# Patient Record
Sex: MALE | Race: WHITE | NOT HISPANIC OR LATINO | Employment: FULL TIME | URBAN - METROPOLITAN AREA
[De-identification: names, ages, dates, MRNs, and addresses within clinical notes are randomized per-mention and may not be internally consistent; named-entity substitution may affect disease eponyms.]

---

## 2017-03-23 ENCOUNTER — OFFICE VISIT - RIVER FALLS (OUTPATIENT)
Dept: FAMILY MEDICINE | Facility: CLINIC | Age: 42
End: 2017-03-23

## 2018-10-27 ENCOUNTER — OFFICE VISIT - RIVER FALLS (OUTPATIENT)
Dept: FAMILY MEDICINE | Facility: CLINIC | Age: 43
End: 2018-10-27

## 2018-11-06 ENCOUNTER — OFFICE VISIT - RIVER FALLS (OUTPATIENT)
Dept: FAMILY MEDICINE | Facility: CLINIC | Age: 43
End: 2018-11-06

## 2021-05-25 ENCOUNTER — OFFICE VISIT - RIVER FALLS (OUTPATIENT)
Dept: FAMILY MEDICINE | Facility: CLINIC | Age: 46
End: 2021-05-25

## 2021-05-25 ASSESSMENT — MIFFLIN-ST. JEOR: SCORE: 2182.63

## 2021-05-26 ENCOUNTER — OFFICE VISIT - RIVER FALLS (OUTPATIENT)
Dept: FAMILY MEDICINE | Facility: CLINIC | Age: 46
End: 2021-05-26

## 2021-05-26 ENCOUNTER — COMMUNICATION - RIVER FALLS (OUTPATIENT)
Dept: FAMILY MEDICINE | Facility: CLINIC | Age: 46
End: 2021-05-26

## 2021-05-26 LAB
CHOLEST SERPL-MCNC: 146 MG/DL
CHOLEST/HDLC SERPL: 3.8 {RATIO}
GLUCOSE BLD-MCNC: 315 MG/DL (ref 65–99)
HDLC SERPL-MCNC: 38 MG/DL
LDLC SERPL CALC-MCNC: 87 MG/DL
NONHDLC SERPL-MCNC: 108 MG/DL
TRIGL SERPL-MCNC: 112 MG/DL

## 2021-05-27 LAB — HBA1C MFR BLD: 10.4 %

## 2021-05-28 ENCOUNTER — OFFICE VISIT - RIVER FALLS (OUTPATIENT)
Dept: FAMILY MEDICINE | Facility: CLINIC | Age: 46
End: 2021-05-28

## 2021-05-28 ASSESSMENT — MIFFLIN-ST. JEOR: SCORE: 2179

## 2021-05-29 LAB
A/G RATIO - HISTORICAL: 1.2 (ref 1–2.5)
ALBUMIN SERPL-MCNC: 4.3 GM/DL (ref 3.6–5.1)
ALP SERPL-CCNC: 98 UNIT/L (ref 36–130)
ALT SERPL W P-5'-P-CCNC: 21 UNIT/L (ref 9–46)
AST SERPL W P-5'-P-CCNC: 15 UNIT/L (ref 10–40)
BASOPHILS # BLD MANUAL: 31 10*3/UL (ref 0–200)
BASOPHILS NFR BLD MANUAL: 0.6 %
BILIRUB SERPL-MCNC: 1.1 MG/DL (ref 0.2–1.2)
BUN SERPL-MCNC: 18 MG/DL (ref 7–25)
BUN/CREAT RATIO - HISTORICAL: ABNORMAL (ref 6–22)
CALCIUM SERPL-MCNC: 9.7 MG/DL (ref 8.6–10.3)
CHLORIDE BLD-SCNC: 100 MMOL/L (ref 98–110)
CO2 SERPL-SCNC: 29 MMOL/L (ref 20–32)
CREAT SERPL-MCNC: 0.8 MG/DL (ref 0.6–1.35)
CREAT UR-MCNC: 138 MG/DL (ref 20–320)
EGFRCR SERPLBLD CKD-EPI 2021: 108 ML/MIN/1.73M2
EOSINOPHIL # BLD MANUAL: 71 10*3/UL (ref 15–500)
EOSINOPHIL NFR BLD MANUAL: 1.4 %
ERYTHROCYTE [DISTWIDTH] IN BLOOD BY AUTOMATED COUNT: 13.1 % (ref 11–15)
GLOBULIN: 3.5 (ref 1.9–3.7)
GLUCOSE BLD-MCNC: 294 MG/DL (ref 65–99)
HCT VFR BLD AUTO: 44.5 % (ref 38.5–50)
HGB BLD-MCNC: 15 GM/DL (ref 13.2–17.1)
LYMPHOCYTES # BLD MANUAL: 1841 10*3/UL (ref 850–3900)
LYMPHOCYTES NFR BLD MANUAL: 36.1 %
MCH RBC QN AUTO: 29.5 PG (ref 27–33)
MCHC RBC AUTO-ENTMCNC: 33.7 GM/DL (ref 32–36)
MCV RBC AUTO: 87.6 FL (ref 80–100)
MICROALBUMIN UR-MCNC: 4.4 MG/DL
MICROALBUMIN/CREAT UR: 32 MG/G{CREAT}
MONOCYTES # BLD MANUAL: 439 10*3/UL (ref 200–950)
MONOCYTES NFR BLD MANUAL: 8.6 %
NEUTROPHILS # BLD MANUAL: 2718 10*3/UL (ref 1500–7800)
NEUTROPHILS NFR BLD MANUAL: 53.3 %
PLATELET # BLD AUTO: 140 10*3/UL (ref 140–400)
PMV BLD: 13.1 FL (ref 7.5–12.5)
POTASSIUM BLD-SCNC: 4.8 MMOL/L (ref 3.5–5.3)
PROT SERPL-MCNC: 7.8 GM/DL (ref 6.1–8.1)
RBC # BLD AUTO: 5.08 10*6/UL (ref 4.2–5.8)
SODIUM SERPL-SCNC: 135 MMOL/L (ref 135–146)
TSH SERPL DL<=0.005 MIU/L-ACNC: 2.03 MIU/L (ref 0.4–4.5)
WBC # BLD AUTO: 5.1 10*3/UL (ref 3.8–10.8)

## 2021-06-01 ENCOUNTER — COMMUNICATION - RIVER FALLS (OUTPATIENT)
Dept: FAMILY MEDICINE | Facility: CLINIC | Age: 46
End: 2021-06-01

## 2021-06-01 ENCOUNTER — OFFICE VISIT - RIVER FALLS (OUTPATIENT)
Dept: FAMILY MEDICINE | Facility: CLINIC | Age: 46
End: 2021-06-01

## 2021-06-01 ASSESSMENT — MIFFLIN-ST. JEOR: SCORE: 2169.93

## 2021-06-24 ENCOUNTER — OFFICE VISIT - RIVER FALLS (OUTPATIENT)
Dept: FAMILY MEDICINE | Facility: CLINIC | Age: 46
End: 2021-06-24

## 2021-06-24 ASSESSMENT — MIFFLIN-ST. JEOR: SCORE: 2173.56

## 2021-07-29 ENCOUNTER — OFFICE VISIT - RIVER FALLS (OUTPATIENT)
Dept: FAMILY MEDICINE | Facility: CLINIC | Age: 46
End: 2021-07-29

## 2021-07-29 ASSESSMENT — MIFFLIN-ST. JEOR: SCORE: 2108.24

## 2021-09-14 ENCOUNTER — AMBULATORY - RIVER FALLS (OUTPATIENT)
Dept: FAMILY MEDICINE | Facility: CLINIC | Age: 46
End: 2021-09-14

## 2021-09-15 ENCOUNTER — COMMUNICATION - RIVER FALLS (OUTPATIENT)
Dept: FAMILY MEDICINE | Facility: CLINIC | Age: 46
End: 2021-09-15

## 2021-09-15 LAB
BUN SERPL-MCNC: 21 MG/DL (ref 7–25)
BUN/CREAT RATIO - HISTORICAL: ABNORMAL (ref 6–22)
CALCIUM SERPL-MCNC: 9.7 MG/DL (ref 8.6–10.3)
CHLORIDE BLD-SCNC: 103 MMOL/L (ref 98–110)
CO2 SERPL-SCNC: 31 MMOL/L (ref 20–32)
CREAT SERPL-MCNC: 0.75 MG/DL (ref 0.6–1.35)
EGFRCR SERPLBLD CKD-EPI 2021: 111 ML/MIN/1.73M2
GLUCOSE BLD-MCNC: 124 MG/DL (ref 65–99)
HBA1C MFR BLD: 6.5 %
POTASSIUM BLD-SCNC: 4.9 MMOL/L (ref 3.5–5.3)
SODIUM SERPL-SCNC: 138 MMOL/L (ref 135–146)

## 2021-12-07 ENCOUNTER — OFFICE VISIT - RIVER FALLS (OUTPATIENT)
Dept: FAMILY MEDICINE | Facility: CLINIC | Age: 46
End: 2021-12-07

## 2021-12-08 ENCOUNTER — COMMUNICATION - RIVER FALLS (OUTPATIENT)
Dept: FAMILY MEDICINE | Facility: CLINIC | Age: 46
End: 2021-12-08

## 2021-12-08 LAB — HBA1C MFR BLD: 6.1 %

## 2022-02-11 VITALS
TEMPERATURE: 98.7 F | OXYGEN SATURATION: 95 % | WEIGHT: 306.8 LBS | WEIGHT: 308.8 LBS | TEMPERATURE: 97.7 F | DIASTOLIC BLOOD PRESSURE: 90 MMHG | SYSTOLIC BLOOD PRESSURE: 140 MMHG | DIASTOLIC BLOOD PRESSURE: 93 MMHG | SYSTOLIC BLOOD PRESSURE: 135 MMHG | HEART RATE: 97 BPM | HEART RATE: 102 BPM

## 2022-02-11 VITALS
BODY MASS INDEX: 37.49 KG/M2 | SYSTOLIC BLOOD PRESSURE: 114 MMHG | WEIGHT: 262.4 LBS | DIASTOLIC BLOOD PRESSURE: 80 MMHG | BODY MASS INDEX: 37.65 KG/M2 | HEART RATE: 106 BPM | DIASTOLIC BLOOD PRESSURE: 84 MMHG | WEIGHT: 278.8 LBS | HEART RATE: 60 BPM | WEIGHT: 278 LBS | TEMPERATURE: 96.9 F | WEIGHT: 276.8 LBS | DIASTOLIC BLOOD PRESSURE: 77 MMHG | SYSTOLIC BLOOD PRESSURE: 120 MMHG | WEIGHT: 276 LBS | HEART RATE: 80 BPM | SYSTOLIC BLOOD PRESSURE: 132 MMHG | BODY MASS INDEX: 37.76 KG/M2 | SYSTOLIC BLOOD PRESSURE: 143 MMHG | DIASTOLIC BLOOD PRESSURE: 84 MMHG | HEIGHT: 72 IN | HEIGHT: 72 IN | HEIGHT: 72 IN | OXYGEN SATURATION: 100 % | HEIGHT: 72 IN | TEMPERATURE: 97.7 F | BODY MASS INDEX: 37.38 KG/M2 | HEIGHT: 72 IN | BODY MASS INDEX: 35.54 KG/M2

## 2022-02-11 VITALS
HEART RATE: 72 BPM | WEIGHT: 270 LBS | DIASTOLIC BLOOD PRESSURE: 81 MMHG | SYSTOLIC BLOOD PRESSURE: 133 MMHG | BODY MASS INDEX: 36.62 KG/M2

## 2022-02-11 VITALS
WEIGHT: 315 LBS | TEMPERATURE: 98.8 F | HEART RATE: 80 BPM | DIASTOLIC BLOOD PRESSURE: 80 MMHG | SYSTOLIC BLOOD PRESSURE: 128 MMHG

## 2022-02-16 NOTE — LETTER
(Inserted Image. Unable to display)            June 01, 2021        AILIN [place] [address]        Dear AILIN,    Thank you for selecting [facility]  for your healthcare needs.  Below you will find the results of the recent tests done at our clinic.     We will discuss this at your next visit.      Result Name Current Result Previous Result Reference Range   Sodium Level (mmol/L)  135 5/28/2021  135 - 146   Potassium Level (mmol/L)  4.8 5/28/2021  3.5 - 5.3   Glucose Level (mg/dL) ((H)) 294 5/28/2021 ((H)) 315 5/25/2021 65 - 99   Creatinine Level (mg/dL)  0.80 5/28/2021  0.60 - 1.35   AST/SGOT (unit/L)  15 5/28/2021  10 - 40   ALT/SGPT (unit/L)  21 5/28/2021  9 - 46   TSH (mIU/L)  2.03 5/28/2021  0.40 - 4.50   Ur Microalbumin/Creatinine Ratio ((H)) 32 5/28/2021   - <30   WBC  5.1 5/28/2021  3.8 - 10.8   Hgb (gm/dL)  15.0 5/28/2021  13.2 - 17.1       Please contact me or my assistant at [phone] if you have any questions.     Sincerely,        Markos Dodge MD        What do your labs mean?  Below is a glossary of commonly ordered labs:  LDL   Bad Cholesterol   HDL   Good Cholesterol  AST/ALT   Liver Function   Cr/Creatinine   Kidney Function  Microalbumin   Kidney Function  BUN   Kidney Function  PSA   Prostate    TSH   Thyroid Hormone  HgbA1c   Diabetes Test   Hgb (Hemoglobin)   Red Blood Cells

## 2022-02-16 NOTE — LETTER
(Inserted Image. Unable to display)   August 25, 2021    AILIN [place] [address]            Dear AILIN,      Thank you for selecting [facility] for your healthcare needs.    Our records indicate you are due for the following services:     Non-Fasting Labs.    If you had your labs done at another facility or with [facility] at [facility], please bring in a copy of the results to your next visit, mail a copy, or drop off a copy of your results to your Healthcare Provider.    (FYI   Regarding office visits: In some instances, a video visit or telephone visit may be offered as an option.)      To schedule an appointment or if you have further questions, please contact your clinic at [phone].      Powered by [employer]    Sincerely,    Markos Dodge MD

## 2022-02-16 NOTE — PROGRESS NOTES
Patient:   AILIN PAUL            MRN: [id number]            FIN: [id number]               Age:   45 years     Sex:  Male     :  1975   Associated Diagnoses:   Type 2 diabetes, HbA1c goal < 7%; HTN (hypertension)   Author:   Markos Dodge MD      Visit Information      Date of Service: 2021 08:53 am  Performing Location: [facility]  Encounter#: [id number]      Primary Care Provider (PCP):  Richard Karimi MD    NPI# [id number]      Referring Provider:  Markos Dodge MD    NPI# [id number]      Chief Complaint   2021 9:02 AM CDT    follow up labs, elevated A1c        History of Present Illness   Patient here with new diagnosis of diabetes.  He presented a few days ago for symptoms of erectile dysfunction.  Labs then revealed a blood sugar of over 300 with an A1c of 10.4.  He is totally asymptomatic.  He is overweight and family history of adult diabetes in his father.         Review of Systems   Constitutional:  Negative.    Eye:  Negative.    Ear/Nose/Mouth/Throat:  Negative.    Respiratory:  Negative.    Cardiovascular:  Negative.    Gastrointestinal:  Negative.    Genitourinary:  Negative except as documented in history of present illness.    Hematology/Lymphatics:  Negative.    Endocrine:  Negative.    Immunologic:  Negative.    Musculoskeletal:  Negative.    Integumentary:  Negative.    Neurologic:  Negative.       Health Status   Allergies:    Allergic Reactions (Selected)  Severity Not Documented  No known allergies (No reactions were documented)   Medications:  (Selected)   Prescriptions  Prescribed  losartan 25 mg oral tablet: = 1 tab(s) ( 25 mg ), Oral, daily, # 90 tab(s), 1 Refill(s), Type: Maintenance, Pharmacy: [facility], 1 tab(s) Oral daily, 72, in, 21 9:02:00 CDT, Height Measured, 278, lb, 21 9:02:00 CDT, Weight Measured  metFORMIN 500 mg oral tablet, extended release: = 1 tab(s) ( 500 mg ), Oral, daily, # 30 tab(s), 0  Refill(s), Type: Maintenance, Pharmacy: [facility], 1 tab(s) Oral daily, 72, in, 05/28/21 9:02:00 CDT, Height Measured, 278, lb, 05/28/21 9:02:00 CDT, Weight Measured  sildenafil 20 mg oral tablet: See Instructions, Instructions: 1 tab(s) Oral up to 5 tabs daily as needed, # 50 EA, 11 Refill(s), Type: Maintenance, Pharmacy: [facility], 1 tab(s) Oral up to 5 tabs daily as needed, 72, in, 05/25/21 16:02:00 CDT, Height Pancho...,    Medications          *denotes recorded medication          losartan 25 mg oral tablet: 25 mg, 1 tab(s), Oral, daily, 90 tab(s), 1 Refill(s).          metFORMIN 500 mg oral tablet, extended release: 500 mg, 1 tab(s), Oral, daily, 30 tab(s), 0 Refill(s).          sildenafil 20 mg oral tablet: See Instructions, 1 tab(s) Oral up to 5 tabs daily as needed, 50 EA, 11 Refill(s).       Problem list:    All Problems  ED (erectile dysfunction) / SNOMED CT 1960396637 / Confirmed  Obesity / SNOMED CT 9545913527 / Probable  Type 2 diabetes, HbA1c goal < 7% / SNOMED CT 533891274 / Confirmed      Histories   Past Medical History:    No active or resolved past medical history items have been selected or recorded.   Family History:    No family history items have been selected or recorded.   Procedure history:    No active procedure history items have been selected or recorded.   Social History:        Electronic Cigarette/Vaping Assessment            Electronic Cigarette Use: Former use, quit more than 90 days ago.      Tobacco Assessment            Former smoker, quit more than 30 days ago        Physical Examination   Vital Signs   5/28/2021 9:02 AM CDT Peripheral Pulse Rate 80 bpm    Pulse Site Brachial artery    HR Method Electronic    Systolic Blood Pressure 132 mmHg  HI    Diastolic Blood Pressure 84 mmHg  HI    Mean Arterial Pressure 100 mmHg    BP Site Right arm    BP Method Electronic      Measurements from flowsheet : Measurements   5/28/2021 9:02  AM CDT Height Measured - Standard 72 in    Weight Measured - Standard 278 lb    BSA 2.53 m2    Body Mass Index 37.7 kg/m2  HI      General:  Alert and oriented, No acute distress.    Eye:  Pupils are equal, round and reactive to light, Extraocular movements are intact.    Neck:  Supple, Non-tender, No lymphadenopathy, No thyromegaly.    Respiratory:  Lungs are clear to auscultation, Respirations are non-labored.    Cardiovascular:  Normal rate, Regular rhythm, No murmur, Good pulses equal in all extremities, Normal peripheral perfusion, No edema.    Gastrointestinal:  Soft, Non-tender, No organomegaly.    Musculoskeletal:  No tenderness, No swelling.    Integumentary:  No rash.    Feet:  Normal by visual exam, Normal pulses, Sensation intact, By monofilament exam.    Neurologic:  Alert, Oriented, No focal deficits, Cranial Nerves II-XII are grossly intact.       Health Maintenance      Recommendations     Pending (in the next year)        Due            Alcohol Misuse Screen due  05/28/21  and every 1  year(s)           DM - Communication with Managing Provider due  05/28/21  and every 1  year(s)           DM - Eye Exam due  05/28/21  and every 1  year(s)           DM - Foot Exam due  05/28/21  and every 1  year(s)           DM - Microalbumin due  05/28/21  and every 1  year(s)           Depression Screen due  05/28/21  and every 1  year(s)           HIV Screen (if sexually active) due  05/28/21  and every 1  year(s)           STD Counseling (if sexually active) (Male) due  05/28/21  and every 1  year(s)           Syphilis Screen (if sexually active) (Male) due  05/28/21  and every 1  year(s)        Due In Future            DM - HgbA1c not due until  08/26/21  and every 3  month(s)           Influenza Vaccine not due until  09/01/21  and every 1  year(s)           Tobacco Use Screen (Male) not due until  05/25/22  and every 1  year(s)           Lipid Disorders Screen (Male) not due until  05/25/22  and every 1   year(s)           Type 2 Diabetes Mellitus Screen (Male) not due until  05/26/22  and every 1  year(s)     Satisfied (in the past 1 year)        Satisfied            Body Mass Index Check on  05/28/21.           Body Mass Index Check on  05/25/21.           DM - HgbA1c on  05/26/21.           High Blood Pressure Screen on  05/28/21.           High Blood Pressure Screen on  05/25/21.           Lipid Disorders Screen (Male) on  05/25/21.           Lipid Disorders Screen (Male) on  05/25/21.           Lipid Disorders Screen (Male) on  05/25/21.           Lipid Disorders Screen (Male) on  05/25/21.           Obesity Screen and Counseling (Male) on  05/28/21.           Obesity Screen and Counseling (Male) on  05/25/21.           Tobacco Use Screen (Male) on  05/25/21.           Type 2 Diabetes Mellitus Screen (Male) on  05/26/21.          Review / Management   Results review:  Lab results   5/26/2021 4:40 PM CDT Hgb A1c 10.4  HI   5/25/2021 4:32 PM CDT Glucose Level 315 mg/dL  HI    Cholesterol 146 mg/dL    Non-HDL Cholesterol 108    HDL 38 mg/dL  LOW    Cholesterol/HDL Ratio 3.8    LDL 87    Triglyceride 112 mg/dL   .       Impression and Plan   Diagnosis     Type 2 diabetes, HbA1c goal < 7% (CPF41-GK E11.9).     HTN (hypertension) (XPT07-QP I10).     Plan:  Patient with new adult onset diabetes mellitus.  He has an appointment next week with diabetic education will be taught glucose monitoring and diabetic diet as well as cares.  He will see his eye doctor for diabetic eye exam.  We will start him on Metformin increased dose in a month when he returns add losartan for both renal protection and blood pressures that are slightly elevated reviewed his labs with him today.  We discussed diabetes and cares.  , BMI,Weight loss,exercise,diet discussed.

## 2022-02-16 NOTE — PROGRESS NOTES
Patient:   AILIN PAUL            MRN: [id number]            FIN: [id number]               Age:   42 years     Sex:  Male     :  1975   Associated Diagnoses:   Cellulitis   Author:   Markos Dodge MD      Visit Information      Date of Service: 2018 06:17 pm  Performing Location: [facility]  Encounter#: [id number]      Primary Care Provider (PCP):  Richard Karimi MD    NPI# [id number]      Referring Provider:  Markos Dodge MD    NPI# [id number]      Chief Complaint   2018 6:23 PM CST    f/up cellulitis        History of Present Illness   chief complaint and symptoms as noted above confirmed with patient   was better on tmp-s not clear Getting worse No fever Minimal discon=mfort      Review of Systems   Constitutional:  Negative except as documented in history of present illness.    Respiratory:  Negative.    Cardiovascular:  Negative.    Gastrointestinal:  Negative.    Genitourinary:  Negative.    Musculoskeletal:  Negative except as documented in history of present illness.    Integumentary:  Negative except as documented in history of present illness.    Neurologic:  Negative.       Health Status   Allergies:    Allergic Reactions (Selected)  Severity Not Documented  No known allergies (No reactions were documented)   Medications:  (Selected)   Prescriptions  Prescribed  Bactrim  mg-160 mg oral tablet: 1 tab(s), PO, BID, # 20 tab(s), 0 Refill(s), Type: Maintenance, Pharmacy: [facility], 1 tab(s) Oral bid,x10 day(s)  Keflex 500 mg oral capsule: = 2 cap(s) ( 1,000 mg ), PO, QID, # 80 cap(s), 0 Refill(s), Type: Maintenance, Pharmacy: [facility], 2 cap(s) Oral qid,x10 day(s)  Documented Medications  Documented  Advil: ( 800 mg ), po, q4-6 hrs, 0 Refill(s), Type: Maintenance,    Medications          *denotes recorded medication          Keflex 500 mg oral capsule: 1,000 mg, 2 cap(s), PO, QID, for 10 day(s), 80 cap(s),  0 Refill(s).          *Advil: 800 mg, po, q4-6 hrs, 0 Refill(s).          Bactrim  mg-160 mg oral tablet: 1 tab(s), PO, BID, for 10 day(s), 20 tab(s), 0 Refill(s).        Histories   Past Medical History:    No active or resolved past medical history items have been selected or recorded.   Family History:    No family history items have been selected or recorded.   Procedure history:    No active procedure history items have been selected or recorded.   Social History:             No active social history items have been recorded.      Physical Examination   Vital Signs   11/6/2018 6:23 PM CST Temperature Tympanic 97.7 DegF  LOW    Peripheral Pulse Rate 97 bpm    HR Method Electronic    Systolic Blood Pressure 135 mmHg  HI    Diastolic Blood Pressure 93 mmHg  HI    Mean Arterial Pressure 107 mmHg    BP Method Electronic      Measurements from flowsheet : Measurements   11/6/2018 6:23 PM CST    Weight Measured - Standard                306.8 lb     General:  Alert and oriented, No acute distress.    Integumentary:  2 lpl edema left lower leg with reddness warmth lower 1/2  cms otherwise intact.    Neurologic:  Alert, Oriented.       Impression and Plan   Diagnosis     Cellulitis (DII96-BF L03.90).     Course:  Not progressing as expected.    Plan:  keflex fu friday soone rif worse.    Patient Instructions:       Counseled: Patient, Regarding diagnosis, Regarding treatment, Regarding medications, Activity.

## 2022-02-16 NOTE — PROGRESS NOTES
Chief Complaint    swollen lower left leg, tender, getting over cold  History of Present Illness      Chief complaint as above reviewed and confirmed with patient.  Pt presents to the clinic with concerns re: L leg redness, swelling and discomfort.  He denies known trauma.  He has had 2 spots on his leg last week that he thought were insect bites.  In the last several days increased redness, swelling.  no fevers. no tingling or numbness.  Elevation not helpful.  no hx of similar.  no hx of DVT in the past.  Review of Systems      Review of systems is negative with the exception of those noted in HPI          Physical Exam   Vitals & Measurements    T: 98.7   F (Tympanic)  HR: 102(Peripheral)  BP: 140/90  SpO2: 95%     WT: 308.8 lb       exam of the LLE reveals moderate erythema and swelling present.  Erythema is in stocking glove distribution but more severe laterally.  erythema extends from ankle to the jsut about 4-5 cm distal to the knee.  There is swelling of the foot dorsally but no erythema of the foot.  there is a small elen red circular area medial calf about mid 1/3 that is circular and with dry dermatitis present.  sensation and peripheral pulses intact, cap refill brisk.  no pain with AROM at the ankle. no joint effusion noted.       LLE duplex US negative for DVT.          Assessment/Plan       1. Cellulitis (L03.90)         bactrim as ordered. PI given and discussed. push fluids, rest and elevation.  warm compresses.  If not improving by mid week should be rechecked.  Pt agreeable.                2. Left leg swelling (M79.89)         Orders:          19341 office outpatient visit 15 minutes (Charge), Quantity: 1, Left leg swelling          US Lower Extremity Venous Doppler (Request), Priority: STAT, Instructions: Left, Left leg swelling                3. Immunization due (Z23)         Orders:          tetanus/diphth/pertuss (Tdap) adult/adol, 0.5 mL, im, once, (Completed)          82690 imadm prq id  subq/im njxs 1 vaccine (Charge), Quantity: 1, Immunization due          94113 tdap vaccine 7/> yr im (Charge), Quantity: 1, Immunization due                Orders:         sulfamethoxazole-trimethoprim, 1 tab(s), PO, BID, # 20 tab(s), 0 Refill(s), Type: Maintenance, Pharmacy: [facility], 1 tab(s) Oral bid,x10 day(s), (Ordered)  Patient Information     Name:AILIN PAUL      Address:      [address] [place] [address]     Sex:Male     YOB: 1975     Phone:[phone]     Emergency Contact:HEATHER PAUL     MRN:[id number]     FIN:[id number]     Location:[facility]     Date of Service:10/27/2018      Primary Care Physician:       Richard Karimi MD, [phone]      Attending Physician:       Muna Clay PA-C, [phone]  Problem List/Past Medical History    Ongoing     No qualifying data    Historical     No qualifying data  Medications     Advil: 800 mg, po, q4-6 hrs, 0 Refill(s).     Bactrim  mg-160 mg oral tablet: 1 tab(s), PO, BID, for 10 day(s), 20 tab(s), 0 Refill(s).          Allergies    No known allergies  Social History    Smoking Status - 10/27/2018     Current some day smoker  Immunizations      Vaccine Date Status      tetanus/diphth/pertuss (Tdap) adult/adol 10/27/2018 Given      Td 10/05/1996 Recorded

## 2022-02-16 NOTE — NURSING NOTE
Pt called looking for test results for A1C that was completed yesterday.  Pt would like to come in and  the results.  Printed and left at  for pt to .

## 2022-02-16 NOTE — PROGRESS NOTES
"   Patient:   AILIN PAUL            MRN: [id number]            FIN: [id number]               Age:   45 years     Sex:  Male     :  1975   Associated Diagnoses:   Type 2 diabetes, HbA1c goal < 7%; Obesity; HTN (hypertension)   Author:   Maxine Beard      Visit Information   Visit type:  Diabetes Self Management Education .    Referral source:  Markos Dodge MD.       Chief Complaint   DM Type II, Obesity       History of Present Illness   2021  Pt here with new surprising dx of DM Type II.  Pt was seen by MD with c/o ED.  Pt does not have an annual physical and MD completed lab work with initial glucose of 315 and follow up a1c 10.4%.  Pt without typical hyperglycemic symptoms.     2021 Follow up education, weight is down 13.6# since 2021; Pt's highest weight was 383#; congratulated pt on his significant weight loss.     Discussed nutrition, diabetes, and lifestyle management with pt.  Over the past year+ pt has been working on weight loss, healthier eating and activity.    Nutrition: Pt is reading labels and following carbohydrate controlled meal plan, continues to make adjustments and knows that he has some \"off\" days.    mid morning yogurt, afternoon a Lean Cuisine or leftovers (portion controlled), afternoon 1-2 rice cakes; evening protein and vegetables and a starch small amount if any   Fluids: zero soda or water, rare milk   Physical Activity: taking daily walks, began jogging a little also on the treadmill; getting in >150 min/ week   Stress:   low/ mod  Sleep: good  Support: wife  BG Testin day avg out of 12 readings 128 mg/dL  14 day avg out of 24 readings 133 mg/dL  30 day avg out of 54 readings 131 mg/dL   90 day avg out of 103 reading 135 mg/dL   DM related medication: metformin ER 500mg ii tab daily - taking as directed (MD increased metformin 2021  Routine diabetes care:    Dilated Retinal Eye Exam:  due   Skin: intact   Dental: good brushing habits   Feet: no " concerns  Immunizations: COVID - 19 vaccines completed, influenza and pneumonia vaccines due  Hgb A1c: 10.4% = 252 mg/dL estimated average glucose 5/26/2021      Health Status   Allergies:    Allergic Reactions (Selected)  Severity Not Documented  No known allergies (No reactions were documented)   Medications:  (Selected)   Prescriptions  Prescribed  Accu-check Meter: Accu-check Meter, See Instructions, Instructions: use as directed-testing 2x daily, Supply, # 1 EA, 0 Refill(s), Type: Maintenance, Pharmacy: [facility], use as directed-testing 2x daily, 72, in, 06/01/21 12:30:00 CDT, Height...  Accu-check lancets: Accu-check lancets, See Instructions, Instructions: testing 2x/ day, Supply, # 100 EA, 3 Refill(s), Type: Maintenance, Pharmacy: [facility], testing 2x/ day, 72, in, 06/01/21 12:30:00 CDT, Height Measured, 276, lb, 06/01/21 12...  Accu-check test strips: Accu-check test strips, See Instructions, Instructions: testing 2x/ day, Supply, # 200 EA, 3 Refill(s), Type: Maintenance, Pharmacy: [facility], testing 2x/ day, 72, in, 06/01/21 12:30:00 CDT, Height Measured, 276, lb, 06/01/2...  losartan 25 mg oral tablet: = 1 tab(s) ( 25 mg ), Oral, daily, # 90 tab(s), 1 Refill(s), Type: Maintenance, Pharmacy: [facility], 1 tab(s) Oral daily, 72, in, 05/28/21 9:02:00 CDT, Height Measured, 278, lb, 05/28/21 9:02:00 CDT, Weight Measured  metFORMIN 500 mg oral tablet, extended release: = 2 tab(s) ( 1,000 mg ), Oral, daily, # 180 tab(s), 1 Refill(s), Type: Maintenance, Pharmacy: [facility], 2 tab(s) Oral daily, 72, in, 06/24/21 8:22:00 CDT, Height Measured, 276.8, lb, 06/24/21 8:22:00 CDT, Weight Measured  sildenafil 20 mg oral tablet: See Instructions, Instructions: 1 tab(s) Oral up to 5 tabs daily as needed, # 50 EA, 11 Refill(s), Type: Maintenance, Pharmacy: [facility] [place], 1  tab(s) Oral up to 5 tabs daily as needed, 72, in, 05/25/21 16:02:00 CDT, Height Pancho...,    Medications          *denotes recorded medication          Accu-check test strips: See Instructions, testing 2x/ day, 200 EA, 3 Refill(s).          Accu-check lancets: See Instructions, testing 2x/ day, 100 EA, 3 Refill(s).          Accu-check Meter: See Instructions, use as directed-testing 2x daily, 1 EA, 0 Refill(s).          losartan 25 mg oral tablet: 25 mg, 1 tab(s), Oral, daily, 90 tab(s), 1 Refill(s).          metFORMIN 500 mg oral tablet, extended release: 1,000 mg, 2 tab(s), Oral, daily, 180 tab(s), 1 Refill(s).          sildenafil 20 mg oral tablet: See Instructions, 1 tab(s) Oral up to 5 tabs daily as needed, 50 EA, 11 Refill(s).       Problem list:    All Problems  Type 2 diabetes, HbA1c goal < 7% / SNOMED CT 577854269 / Confirmed  Obesity / SNOMED CT 5345069529 / Probable  HTN (hypertension) / SNOMED CT 0540083387 / Confirmed  ED (erectile dysfunction) / SNOMED CT 9494899523 / Confirmed      Histories   Past Medical History:    No active or resolved past medical history items have been selected or recorded.   Family History:    No family history items have been selected or recorded.   Procedure history:    No active procedure history items have been selected or recorded.   Social History:        Electronic Cigarette/Vaping Assessment            Electronic Cigarette Use: Former use, quit more than 90 days ago.      Tobacco Assessment            Former smoker, quit more than 30 days ago        Physical Examination   Vital Signs   7/29/2021 11:04 AM CDT Systolic Blood Pressure 114 mmHg    Diastolic Blood Pressure 77 mmHg    Mean Arterial Pressure 89 mmHg      Measurements from flowsheet : Measurements   7/29/2021 11:04 AM CDT Height Measured - Standard 72 in    Weight Measured - Standard 262.4 lb    BSA 2.46 m2    Body Mass Index 35.58 kg/m2  HI         Health Maintenance      Recommendations     Pending (in the  next year)        Due            Alcohol Misuse Screen due  07/29/21  and every 1  year(s)           DM - Communication with Managing Provider due  07/29/21  and every 1  year(s)           DM - Eye Exam due  07/29/21  and every 1  year(s)           Depression Screen due  07/29/21  and every 1  year(s)           HIV Screen (if sexually active) due  07/29/21  and every 1  year(s)           Syphilis Screen (if sexually active) due  07/29/21  and every 1  year(s)        Near Due            DM - HgbA1c near due  08/26/21  and every 3  month(s)           Influenza Vaccine near due  09/01/21  and every 1  year(s)        Due In Future            Lipid Disorders Screen not due until  05/25/22  and every 1  year(s)           Type 2 Diabetes Mellitus Screen not due until  05/26/22  and every 1  year(s)           DM - Microalbumin not due until  05/28/22  and every 1  year(s)           DM - Foot Exam not due until  05/28/22  and every 1  year(s)           Tobacco Use Screen not due until  06/24/22  and every 1  year(s)     Satisfied (in the past 1 year)        Satisfied            Body Mass Index Check on  07/29/21.           Body Mass Index Check on  06/24/21.           Body Mass Index Check on  06/01/21.           Body Mass Index Check on  05/28/21.           Body Mass Index Check on  05/25/21.           COVID-19 Vaccine (Moderna) Dose 2 on  05/14/21.           COVID-19 Vaccine (Moderna) Dose 1 on  04/16/21.           DM - Foot Exam on  05/28/21.           DM - HgbA1c on  05/26/21.           DM - Microalbumin on  05/28/21.           DM - Microalbumin on  05/28/21.           High Blood Pressure Screen on  07/29/21.           High Blood Pressure Screen on  06/24/21.           High Blood Pressure Screen on  05/28/21.           High Blood Pressure Screen on  05/25/21.           Lipid Disorders Screen on  05/25/21.           Lipid Disorders Screen on  05/25/21.           Lipid Disorders Screen on  05/25/21.           Lipid  Disorders Screen on  05/25/21.           Obesity Screen and Counseling on  07/29/21.           Obesity Screen and Counseling on  06/24/21.           Obesity Screen and Counseling on  06/01/21.           Obesity Screen and Counseling on  05/28/21.           Obesity Screen and Counseling on  05/25/21.           Tobacco Use Screen on  06/24/21.           Tobacco Use Screen on  05/25/21.           Type 2 Diabetes Mellitus Screen on  05/26/21.        Canceled            STD Counseling (If sexually active) on  06/24/21.          Review / Management   Results review:  Lab results   5/28/2021 9:31 AM CDT Sodium Level 135 mmol/L    Potassium Level 4.8 mmol/L    Chloride Level 100 mmol/L    CO2 Level 29 mmol/L    Glucose Level 294 mg/dL  HI    BUN 18 mg/dL    Creatinine 0.80 mg/dL    BUN/Creat Ratio NOT APPLICABLE    eGFR 108 mL/min/1.73m2    eGFR African American 125 mL/min/1.73m2    Calcium Level 9.7 mg/dL    Bili Total 1.1 mg/dL    Alk Phos 98 unit/L    AST/SGOT 15 unit/L    ALT/SGPT 21 unit/L    Protein Total 7.8 gm/dL    Albumin Level 4.3 gm/dL    Globulin 3.5    A/G Ratio 1.2    TSH 2.03 mIU/L    U Creatinine 138 mg/dL    U Microalbumin 4.4 mg/dL    Ur Microalb/Creat Ratio 32  HI    WBC 5.1    RBC 5.08    Hgb 15.0 gm/dL    Hct 44.5 %    MCV 87.6 fL    MCH 29.5 pg    MCHC 33.7 gm/dL    RDW 13.1 %    Platelet 140    MPV 13.1 fL  HI    Lymphs 36.1 %    Abs Lymphs 1,841    Neutrophils 53.3 %    Abs Neutrophils 2,718    Monocytes 8.6 %    Abs Monocytes 439    Eosinophils 1.4 %    Abs Eosinophils 71    Basophils 0.6 %    Abs Basophils 31   5/26/2021 4:40 PM CDT Hgb A1c 10.4  HI   5/25/2021 4:32 PM CDT Glucose Level 315 mg/dL  HI    Cholesterol 146 mg/dL    Non-    HDL 38 mg/dL  LOW    Chol/HDL Ratio 3.8    LDL 87    Triglyceride 112 mg/dL   .       Impression and Plan   Diagnosis     Type 2 diabetes, HbA1c goal < 7% (EAT36-VG E11.9).     Obesity (TMD18-CB E66.9).     HTN (hypertension) (FBA22-EU I10).       Counseled:  Patient, AADE7 Self Care Behaviors   Today the patient was provided with a review and further instruction on the progression of diabetes mellitus, prevention of heart disease, prevention of complications, and lifestyle guidelines.  Healthy Eating - review of carbohydrate counting, reading food labels, appropriate portion sizes, well balanced meals, diabetic plate method as a general rule.  Patient is provided with additional ideas to incorporate dietary recommendations, increase meal planning and preparation.  Mindful eating, finding other coping mechanisms besides food  Instructed on Therapeutic Lifestyle Changes (TLC) nutrition plan for heart healthy eating.     Low cholesterol (<200mg), low fat, low saturated fat, zero trans fat   Low sodium (2000mg)   High fiber diet (20 - 30gm); Soluble fiber 10+ gm/ day    Eat more omega 3 fats  Vegetarian at least 1 day per week  Being Active - Education on how exercise helps with :    - lowering BG by increasing the muscles ability to take up and use glucose   - weight loss   - healthier heart (improve lipid profile)   - improve sleep, mood, energy   - decrease stress  Monitoring -  Reviewed recommended testing schedule and blood glucose target levels.    Taking Medication - on Metformin - education on the mechanism of action. Discussed gradual progression of diabetes and potential to increase/ add medication in the future.      Problem Solving - medical support team assistance, resources provided (written and apps, internet); good control of stress  Healthy Coping - support of friends, family, and medical support team   Reducing Risks - Detailed education on potential complications associated with uncontrolled diabetes and prevention recommendations.  Recommendations include: annual eye exam, good oral cares with brushing BID and flossing daily, routine dental appointments, good foot care tips and shoe wear - discussed monofilament test yearly.  Importance of adequate sleep  and good control of BG to prevent developing complications related to uncontrolled DM including nephropathy, neuropathy, retinopathy, and cardiovascular disease.  Weight loss goal of 7 - 10% of current body weight pounds as a reasonable goal to help increase insulin sensitivity     Goals:  1.  A1c <6.5%  2.  BG testing 2x/ day on 2 days/ week before eating 80 - 130 target; two hours after eating 80 - 150mg/dL  3.  Physical active 150 min/ week   4.  Carb controlled heart healthy meal plan <130gm CHO/ day  5.  Take medications as directed continue metformin ER 500mg i tab daily    Follow up in 4-6 mo       Professional Services   Time spent with pt 30 min   cc  Dr. Dodge

## 2022-02-16 NOTE — LETTER
(Inserted Image. Unable to display)            September 15, 2021        AIL[place] [address]        Dear AILIN,    Thank you for selecting [facility]  for your healthcare needs.  Below you will find the results of the recent tests done at our clinic.     All labs acceptable.      Result Name Current Result Previous Result Reference Range   Potassium Level (mmol/L)  4.9 9/14/2021  4.8 5/28/2021 3.5 - 5.3   Glucose Level (mg/dL) ((H)) 124 9/14/2021 ((H)) 294 5/28/2021 65 - 99   Creatinine Level (mg/dL)  0.75 9/14/2021  0.80 5/28/2021 0.60 - 1.35   Hgb A1c ((H)) 6.5 9/14/2021 ((H)) 10.4 5/26/2021  - <5.7       Please contact me or my assistant at [phone] if you have any questions.     Sincerely,        Markos Dodge MD        What do your labs mean?  Below is a glossary of commonly ordered labs:  LDL   Bad Cholesterol   HDL   Good Cholesterol  AST/ALT   Liver Function   Cr/Creatinine   Kidney Function  Microalbumin   Kidney Function  BUN   Kidney Function  PSA   Prostate    TSH   Thyroid Hormone  HgbA1c   Diabetes Test   Hgb (Hemoglobin)   Red Blood Cells

## 2022-02-16 NOTE — PROGRESS NOTES
Patient:   AILIN PAUL            MRN: [id number]            FIN: [id number]               Age:   45 years     Sex:  Male     :  1975   Associated Diagnoses:   ED (erectile dysfunction)   Author:   Markos Dodge MD      Visit Information      Date of Service: 2021 03:57 pm  Performing Location: [facility]  Encounter#: [id number]      Primary Care Provider (PCP):  Richard Karimi MD    NPI# [id number]      Referring Provider:  Markos Dodge MD    NPI# [id number]      Chief Complaint   2021 4:02 PM CDT    ED concerns.        History of Present Illness   Patient here for erectile dysfunction.  He notes it has been an issue for few years but worse over the last 6 months.  He has difficulty getting and maintaining erections.  Denies any health problems.  Denies any significant alcohol use.  Is a non-smoker.         Review of Systems   Constitutional:  Negative except as documented in history of present illness.    Respiratory:  Negative.    Cardiovascular:  Negative.    Gastrointestinal:  Negative.    Genitourinary:  Negative except as documented in history of present illness.    Neurologic:  Negative.       Health Status   Allergies:    Allergic Reactions (Selected)  Severity Not Documented  No known allergies (No reactions were documented)   Medications:  (Selected)   Prescriptions  Prescribed  sildenafil 20 mg oral tablet: See Instructions, Instructions: 1 tab(s) Oral up to 5 tabs daily as needed, # 50 EA, 11 Refill(s), Type: Maintenance, Pharmacy: [facility], 1 tab(s) Oral up to 5 tabs daily as needed, 72, in, 21 16:02:00 CDT, Height Pancho...   Problem list:    All Problems  Obesity / SNOMED CT 4011860156 / Probable      Histories   Past Medical History:    No active or resolved past medical history items have been selected or recorded.   Family History:    No family history items have been selected or recorded.   Procedure history:    No  active procedure history items have been selected or recorded.   Social History:        Electronic Cigarette/Vaping Assessment            Electronic Cigarette Use: Former use, quit more than 90 days ago.      Tobacco Assessment            Former smoker, quit more than 30 days ago        Physical Examination   Measurements from flowsheet : Measurements   5/25/2021 4:02 PM CDT Height Measured - Standard 72 in    Weight Measured - Standard 278.8 lb    BSA 2.53 m2    Body Mass Index 37.81 kg/m2  HI      General:  Alert and oriented, No acute distress.    Neurologic:  Alert, Oriented.       Impression and Plan   Diagnosis     ED (erectile dysfunction) (XIL35-ED N52.9).     Plan:  Patient with erectile dysfunction discussed sildenafil and use.  If he does not see improvement with sildenafil will get further work-up.  Also reviewed healthcare maintenance including colon cancer screening lipids and glucose.  Also reviewed his elevated blood pressure and handout given  .

## 2022-02-16 NOTE — NURSING NOTE
Comprehensive Intake Entered On:  6/24/2021 8:26 AM CDT    Performed On:  6/24/2021 8:22 AM CDT by Jadon ALEJANDRO, Fany               Summary   Chief Complaint :   1  month f/u   Weight Measured :   276.8 lb(Converted to: 276 lb 13 oz, 125.554 kg)    Height Measured :   72 in(Converted to: 6 ft 0 in, 182.88 cm)    Body Mass Index :   37.54 kg/m2 (HI)    Body Surface Area :   2.52 m2   Systolic Blood Pressure :   120 mmHg   Diastolic Blood Pressure :   80 mmHg   Mean Arterial Pressure :   93 mmHg   Peripheral Pulse Rate :   60 bpm   BP Site :   Right arm   Pulse Site :   Radial artery   BP Method :   Electronic   HR Method :   Electronic   Temperature Tympanic :   96.9 DegF(Converted to: 36.1 DegC)  (LOW)    Oxygen Saturation :   100 %   Fany Diop MA - 6/24/2021 8:22 AM CDT   Health Status   Allergies Verified? :   Yes   Medication History Verified? :   Yes   Medical History Verified? :   Yes   Pre-Visit Planning Status :   Completed   Tobacco Use? :   Former smoker   Fany Diop MA - 6/24/2021 8:22 AM CDT   Consents   Consent for Immunization Exchange :   Consent Granted   Consent for Immunizations to Providers :   Consent Granted   Fany Diop MA - 6/24/2021 8:22 AM CDT   Meds / Allergies   (As Of: 6/24/2021 8:26:08 AM CDT)   Allergies (Active)   No known allergies  Estimated Onset Date:   Unspecified ; Created By:   Generated Domain User for [id number]; Reaction Status:   Active ; Substance:   No known allergies ; Updated By:   Generated Domain User for [id number]; Reviewed Date:   6/1/2021 12:31 PM CDT        Medication List   (As Of: 6/24/2021 8:26:08 AM CDT)   Prescription/Discharge Order    losartan  :   losartan ; Status:   Prescribed ; Ordered As Mnemonic:   losartan 25 mg oral tablet ; Simple Display Line:   25 mg, 1 tab(s), Oral, daily, 90 tab(s), 1 Refill(s) ; Ordering Provider:   Markos Dodge MD; Catalog Code:   losartan ; Order Dt/Tm:   5/28/2021 9:15:18 AM CDT          metFORMIN  :    metFORMIN ; Status:   Prescribed ; Ordered As Mnemonic:   metFORMIN 500 mg oral tablet, extended release ; Simple Display Line:   500 mg, 1 tab(s), Oral, daily, 30 tab(s), 0 Refill(s) ; Ordering Provider:   Markos Dodge MD; Catalog Code:   metFORMIN ; Order Dt/Tm:   5/28/2021 9:15:45 AM CDT          Miscellaneous Rx Supply  :   Miscellaneous Rx Supply ; Status:   Prescribed ; Ordered As Mnemonic:   Accu-check lancets ; Simple Display Line:   See Instructions, testing 2x/ day, 100 EA, 3 Refill(s) ; Ordering Provider:   Richard Karimi MD; Catalog Code:   Miscellaneous Rx Supply ; Order Dt/Tm:   6/1/2021 2:22:22 PM CDT          Miscellaneous Rx Supply  :   Miscellaneous Rx Supply ; Status:   Prescribed ; Ordered As Mnemonic:   Accu-check Meter ; Simple Display Line:   See Instructions, use as directed-testing 2x daily, 1 EA, 0 Refill(s) ; Ordering Provider:   Richard Karimi MD; Catalog Code:   Miscellaneous Rx Supply ; Order Dt/Tm:   6/1/2021 2:22:54 PM CDT          Miscellaneous Rx Supply  :   Miscellaneous Rx Supply ; Status:   Prescribed ; Ordered As Mnemonic:   Accu-check test strips ; Simple Display Line:   See Instructions, testing 2x/ day, 200 EA, 3 Refill(s) ; Ordering Provider:   Richard Karimi MD; Catalog Code:   Miscellaneous Rx Supply ; Order Dt/Tm:   6/1/2021 2:21:50 PM CDT          sildenafil  :   sildenafil ; Status:   Prescribed ; Ordered As Mnemonic:   sildenafil 20 mg oral tablet ; Simple Display Line:   See Instructions, 1 tab(s) Oral up to 5 tabs daily as needed, 50 EA, 11 Refill(s) ; Ordering Provider:   Markos Dodge MD; Catalog Code:   sildenafil ; Order Dt/Tm:   5/25/2021 4:13:52 PM CDT            Home Meds    metformin  :   metformin ; Status:   Deleted ; Ordered As Mnemonic:   MetFORMIN (Eqv-Glucophage XR) 500 mg oral tablet, extended release ; Simple Display Line:   0 Refill(s) ; Catalog Code:   metFORMIN ; Order Dt/Tm:   6/24/2021 6:47:01 AM CDT ; Comment:   Responsible  Provider: ROE MAK            Social History   Social History   (As Of: 6/24/2021 8:26:08 AM CDT)   Tobacco:        Former smoker, quit more than 30 days ago   (Last Updated: 5/25/2021 4:02:23 PM CDT by Flakita Carney)          Electronic Cigarette/Vaping:        Electronic Cigarette Use: Former use, quit more than 90 days ago.   (Last Updated: 5/25/2021 4:02:40 PM CDT by Flakita Carney)

## 2022-02-16 NOTE — NURSING NOTE
Comprehensive Intake Entered On:  2021 5:30 PM CST    Performed On:  2021 5:26 PM CST by Lottie GLASS Grazyna               Summary   Chief Complaint :   DM/HTN f/u and refills. Labs done . COVID booster due. Cologuard never done from July -  this visit. Foot exam due.   Weight Measured :   270 lb(Converted to: 270 lb 0 oz, 122.470 kg)    Systolic Blood Pressure :   133 mmHg (HI)    Diastolic Blood Pressure :   81 mmHg (HI)    Mean Arterial Pressure :   98 mmHg   Peripheral Pulse Rate :   72 bpm   BP Site :   Right arm   Pulse Site :   Radial artery   BP Method :   Electronic   HR Method :   Electronic   Grazyna Tafoya CMA - 2021 5:26 PM CST   Health Status   Allergies Verified? :   Yes   Medication History Verified? :   Yes   Pre-Visit Planning Status :   Completed   Grazyna Tafoya CMA - 2021 5:26 PM CST   Demographics   Last Name :   HUMBERTO   Address :   [address]   First Name :   AILIN Freedman Initial :   S   Responsible Party Date of Birth () :   1975 CST   City :   [place]   State :   [place]   Zip Code :   [address]   Contact Relationship to Patient (other) :   Patient   Grazyna Tafoya CMA - 2021 5:26 PM CST   Consents   Consent for Immunization Exchange :   Consent Granted   Consent for Immunizations to Providers :   Consent Granted   Grazyna Tafoya CMA - 2021 5:26 PM CST   Meds / Allergies   (As Of: 2021 5:30:03 PM CST)   Allergies (Active)   No known allergies  Estimated Onset Date:   Unspecified ; Created By:   Generated Domain User for [id number]; Reaction Status:   Active ; Substance:   No known allergies ; Updated By:   Generated Domain User for [id number]; Reviewed Date:   2021 8:40 AM CDT        Medication List   (As Of: 2021 5:30:03 PM CST)   Prescription/Discharge Order    losartan  :   losartan ; Status:   Prescribed ; Ordered As Mnemonic:   losartan 25 mg oral tablet ; Simple Display Line:   25 mg, 1 tab(s), Oral, daily, 90 tab(s), 1  Refill(s) ; Ordering Provider:   Markos Dodge MD; Catalog Code:   losartan ; Order Dt/Tm:   5/28/2021 9:15:18 AM CDT          metFORMIN  :   metFORMIN ; Status:   Prescribed ; Ordered As Mnemonic:   metFORMIN 500 mg oral tablet, extended release ; Simple Display Line:   1,000 mg, 2 tab(s), Oral, daily, 180 tab(s), 1 Refill(s) ; Ordering Provider:   Markos Dodge MD; Catalog Code:   metFORMIN ; Order Dt/Tm:   6/24/2021 8:37:25 AM CDT          Miscellaneous Rx Supply  :   Miscellaneous Rx Supply ; Status:   Prescribed ; Ordered As Mnemonic:   Accu-check lancets ; Simple Display Line:   See Instructions, testing 2x/ day, 100 EA, 3 Refill(s) ; Ordering Provider:   Richard Karimi MD; Catalog Code:   Miscellaneous Rx Supply ; Order Dt/Tm:   6/1/2021 2:22:22 PM CDT          Miscellaneous Rx Supply  :   Miscellaneous Rx Supply ; Status:   Prescribed ; Ordered As Mnemonic:   Accu-check Meter ; Simple Display Line:   See Instructions, use as directed-testing 2x daily, 1 EA, 0 Refill(s) ; Ordering Provider:   Richard Karimi MD; Catalog Code:   Miscellaneous Rx Supply ; Order Dt/Tm:   6/1/2021 2:22:54 PM CDT          Miscellaneous Rx Supply  :   Miscellaneous Rx Supply ; Status:   Prescribed ; Ordered As Mnemonic:   Accu-check test strips ; Simple Display Line:   See Instructions, testing 2x/ day, 200 EA, 3 Refill(s) ; Ordering Provider:   Richard Karimi MD; Catalog Code:   Miscellaneous Rx Supply ; Order Dt/Tm:   6/1/2021 2:21:50 PM CDT          sildenafil  :   sildenafil ; Status:   Prescribed ; Ordered As Mnemonic:   sildenafil 20 mg oral tablet ; Simple Display Line:   See Instructions, 1 tab(s) Oral up to 5 tabs daily as needed, 50 EA, 11 Refill(s) ; Ordering Provider:   Markos Dodge MD; Catalog Code:   sildenafil ; Order Dt/Tm:   5/25/2021 4:13:52 PM CDT

## 2022-02-16 NOTE — PROGRESS NOTES
Patient:   AILIN PAUL            MRN: [id number]            FIN: [id number]               Age:   41 years     Sex:  Male     :  1975   Associated Diagnoses:   Left hip pain; Left leg numbness   Author:   Rosalio Sinclair MD      Visit Information      Date of Service: 2017 05:18 pm  Performing Location: [facility]  Encounter#: [id number]      Primary Care Provider (PCP):  Richard Karimi MD    NPI# [id number]      Referring Provider:  No referring provider recorded for selected visit.      Chief Complaint   3/23/2017 5:24 PM CDT    Left hip pain x 3-4 days-no known injury.  Today started having pain/numbness in left lower leg and foot              Additional Information:No additional information recorded during visit.   Chief complaint and symptoms as noted above and confirmed with patient.  Recent lab and diagnostic studies reviewed with patient      History of Present Illness   3/23/2017: Presents to clinic approximately 4 day history of worsening anterior left hip pains.  positional changes do not seem to make much difference.  No previous hip or back troubles.  Does describe having some paresthesias in his left foot and ankle.  He has been using ibuprofen for last few days with some limited relief.  No reported trauma.  No fever chills.  He is obese.       Review of Systems   Constitutional:  No fever, No chills.    Eye:  Negative except as documented in history of present illness.    Ear/Nose/Mouth/Throat:  Negative except as documented in history of present illness.    Respiratory:  No shortness of breath.    Cardiovascular:  No chest pain, No palpitations, No peripheral edema, No syncope.    Gastrointestinal:  No nausea, No vomiting, No abdominal pain.    Genitourinary:  No dysuria, No hematuria.    Hematology/Lymphatics:  Negative except as documented in history of present illness.    Endocrine:  No excessive thirst, No polyuria.    Immunologic:  No recurrent fevers.     Musculoskeletal:  Joint pain, Decreased range of motion, No muscle pain, No trauma.    Neurologic:  Alert and oriented X4, No numbness, No tingling, No headache.       Health Status   Allergies:    Allergic Reactions (Selected)  Severity Not Documented  No known allergies (No reactions were documented)   Medications:  (Selected)   Documented Medications  Documented  Advil: ( 800 mg ), po, q4-6 hrs, 0 Refill(s), Type: Maintenance   Problem list:    No problem items selected or recorded.      Histories   Past Medical History:    No active or resolved past medical history items have been selected or recorded.   Family History:    No family history items have been selected or recorded.   Procedure history:    No active procedure history items have been selected or recorded.   Social History:             No active social history items have been recorded.      Physical Examination   vital signs stable, as noted above   Vital Signs   3/23/2017 5:24 PM CDT Temperature Tympanic 98.8 DegF    Peripheral Pulse Rate 80 bpm    Systolic Blood Pressure 128 mmHg    Diastolic Blood Pressure 80 mmHg    Mean Arterial Pressure 96 mmHg    BP Site Right arm      Measurements from flowsheet : Measurements   3/23/2017 5:24 PM CDT    Weight Measured - Standard                328.2 lb     General:  Alert and oriented, No acute distress.    Respiratory:  Respirations are non-labored.    Cardiovascular:  Normal rate.    Musculoskeletal:  Normal strength, No tenderness, Pains with straight left raise on left.  No pains with internal/external rotation of left hip, no pains with palpation of IT band.    Neurologic:  Alert, Oriented, Normal motor function, No focal deficits, Normal deep tendon reflexes.    Cognition and Speech:  Oriented, Speech clear and coherent.    Psychiatric:  Appropriate mood & affect.       Impression and Plan   Diagnosis     Left hip pain (BCT21-OJ M25.552).     Left leg numbness (YWU00-QJ R20.0).       Examination does  not sound typical for hip related pain.  No evidence of bursitis based on examination.  Unclear if this is a hip joint versus a lumbar radicular pain.  We will try Toradol 60 mg IM here in clinic today.  Advised to continue with high-dose ibuprofen for the coming days.  Obtain x-ray of left hip. we will place Ortho referral for further localization.

## 2022-02-16 NOTE — PROGRESS NOTES
Patient:   AILIN PAUL            MRN: [id number]            FIN: [id number]               Age:   46 years     Sex:  Male     :  1975   Associated Diagnoses:   Type 2 diabetes, HbA1c goal < 7%; HTN (hypertension)   Author:   Markos Dodge MD      Visit Information      Date of Service: 2021 05:17 pm  Performing Location: [facility]  Encounter#: [id number]      Primary Care Provider (PCP):  Richard Karimi MD    NPI# [id number]      Referring Provider:  Markos Dodge MD    NPI# [id number]      Chief Complaint   2021 5:26 PM CST    DM/HTN f/u and refills. Labs done . COVID booster due. Cologuard never done from July -  this visit. Foot exam due.        History of Present Illness   Patient is here for 6-month follow-up.  Overall he is doing well.  Taking meds as directed.  Glucose runs between low 100s to 180.  Checks 3 times a week.  Has 4 family members have all been vaccinated for Covid.  Is a 22-year-old and a 15-year-old who also got Covid.           Review of Systems   Constitutional:  Negative.    Eye:  Negative.    Ear/Nose/Mouth/Throat:  Negative.    Respiratory:  Negative.    Cardiovascular:  Negative.    Gastrointestinal:  Negative.    Genitourinary:  Negative.    Hematology/Lymphatics:  Negative.    Endocrine:  Negative.    Immunologic:  Negative.    Musculoskeletal:  Negative.    Integumentary:  Negative.    Neurologic:  Negative.       Health Status   Allergies:    Allergic Reactions (Selected)  Severity Not Documented  No known allergies (No reactions were documented)   Medications:  (Selected)   Prescriptions  Prescribed  Accu-check Meter: Accu-check Meter, See Instructions, Instructions: use as directed-testing 2x daily, Supply, # 1 EA, 0 Refill(s), Type: Maintenance, Pharmacy: [facility], use as directed-testing 2x daily, 72, in, 21 12:30:00 CDT, Height...  Accu-check lancets: Accu-check lancets, See  Instructions, Instructions: testing 2x/ day, Supply, # 100 EA, 3 Refill(s), Type: Maintenance, Pharmacy: [facility], testing 2x/ day, 72, in, 06/01/21 12:30:00 CDT, Height Measured, 276, lb, 06/01/21 12...  Accu-check test strips: Accu-check test strips, See Instructions, Instructions: testing 2x/ day, Supply, # 200 EA, 3 Refill(s), Type: Maintenance, Pharmacy: [facility], testing 2x/ day, 72, in, 06/01/21 12:30:00 CDT, Height Measured, 276, lb, 06/01/2...  losartan 25 mg oral tablet: = 1 tab(s) ( 25 mg ), Oral, daily, # 90 tab(s), 1 Refill(s), Type: Maintenance, Pharmacy: [facility], 1 tab(s) Oral daily, 72, in, 05/28/21 9:02:00 CDT, Height Measured, 278, lb, 05/28/21 9:02:00 CDT, Weight Measured  metFORMIN 500 mg oral tablet, extended release: = 2 tab(s) ( 1,000 mg ), Oral, daily, # 180 tab(s), 1 Refill(s), Type: Maintenance, Pharmacy: [facility], 2 tab(s) Oral daily, 72, in, 06/24/21 8:22:00 CDT, Height Measured, 276.8, lb, 06/24/21 8:22:00 CDT, Weight Measured  sildenafil 20 mg oral tablet: See Instructions, Instructions: 1 tab(s) Oral up to 5 tabs daily as needed, # 50 EA, 11 Refill(s), Type: Maintenance, Pharmacy: [facility], 1 tab(s) Oral up to 5 tabs daily as needed, 72, in, 05/25/21 16:02:00 CDT, Height Pancho...,    Medications          *denotes recorded medication          Accu-check test strips: See Instructions, testing 2x/ day, 200 EA, 3 Refill(s).          Accu-check lancets: See Instructions, testing 2x/ day, 100 EA, 3 Refill(s).          Accu-check Meter: See Instructions, use as directed-testing 2x daily, 1 EA, 0 Refill(s).          losartan 25 mg oral tablet: 25 mg, 1 tab(s), Oral, daily, 90 tab(s), 1 Refill(s).          metFORMIN 500 mg oral tablet, extended release: 1,000 mg, 2 tab(s), Oral, daily, 180 tab(s), 1 Refill(s).          sildenafil 20 mg oral tablet: See  Instructions, 1 tab(s) Oral up to 5 tabs daily as needed, 50 EA, 11 Refill(s).       Problem list:    All Problems  Obesity / SNOMED CT 0546953698 / Probable  ED (erectile dysfunction) / SNOMED CT 9017777445 / Confirmed  Type 2 diabetes, HbA1c goal < 7% / SNOMED CT 935253367 / Confirmed  HTN (hypertension) / SNOMED CT 5044146243 / Confirmed      Histories   Past Medical History:    No active or resolved past medical history items have been selected or recorded.   Family History:    No family history items have been selected or recorded.   Procedure history:    No active procedure history items have been selected or recorded.   Social History:        Electronic Cigarette/Vaping Assessment            Electronic Cigarette Use: Former use, quit more than 90 days ago.      Tobacco Assessment            Former smoker, quit more than 30 days ago        Physical Examination   Vital Signs   12/7/2021 5:26 PM CST Peripheral Pulse Rate 72 bpm    Pulse Site Radial artery    HR Method Electronic    Systolic Blood Pressure 133 mmHg  HI    Diastolic Blood Pressure 81 mmHg  HI    Mean Arterial Pressure 98 mmHg    BP Site Right arm    BP Method Electronic      Measurements from flowsheet : Measurements   12/7/2021 5:26 PM CST    Weight Measured - Standard                270 lb     General:  Alert and oriented, No acute distress.    Respiratory:  Lungs are clear to auscultation, Respirations are non-labored.    Cardiovascular:  Normal rate, Regular rhythm, No murmur.    Feet:  Normal by visual exam, Normal pulses, Sensation intact, By monofilament exam.    Neurologic:  Alert, Oriented.       Health Maintenance      Recommendations     Pending (in the next year)        OverDue           Influenza Vaccine due  09/01/21  and every 1  year(s)        Due            Alcohol Misuse Screen due  12/07/21  and every 1  year(s)           DM - Communication with Managing Provider due  12/07/21  and every 1  year(s)           DM - Eye Exam due   12/07/21  and every 1  year(s)           Depression Screen due  12/07/21  and every 1  year(s)           HIV Screen (if sexually active) due  12/07/21  and every 1  year(s)           Syphilis Screen (if sexually active) due  12/07/21  and every 1  year(s)        Near Due            DM - HgbA1c near due  12/14/21  and every 3  month(s)        Due In Future            Lipid Disorders Screen not due until  05/25/22  and every 1  year(s)           DM - Microalbumin not due until  05/28/22  and every 1  year(s)           DM - Foot Exam not due until  05/28/22  and every 1  year(s)           Tobacco Use Screen not due until  06/24/22  and every 1  year(s)           Body Mass Index Check not due until  07/29/22  and every 1  year(s)           Type 2 Diabetes Mellitus Screen not due until  09/14/22  and every 1  year(s)     Satisfied (in the past 1 year)        Satisfied            Body Mass Index Check on  07/29/21.           Body Mass Index Check on  06/24/21.           Body Mass Index Check on  06/01/21.           Body Mass Index Check on  05/28/21.           Body Mass Index Check on  05/25/21.           COVID-19 Vaccine (Moderna) Dose 2 on  05/14/21.           COVID-19 Vaccine (Moderna) Dose 1 on  04/16/21.           DM - Foot Exam on  05/28/21.           DM - HgbA1c on  09/14/21.           DM - HgbA1c on  05/26/21.           DM - Microalbumin on  05/28/21.           DM - Microalbumin on  05/28/21.           High Blood Pressure Screen on  12/07/21.           High Blood Pressure Screen on  07/29/21.           High Blood Pressure Screen on  06/24/21.           High Blood Pressure Screen on  05/28/21.           High Blood Pressure Screen on  05/25/21.           Lipid Disorders Screen on  05/25/21.           Lipid Disorders Screen on  05/25/21.           Lipid Disorders Screen on  05/25/21.           Lipid Disorders Screen on  05/25/21.           Obesity Screen and Counseling on  12/07/21.           Obesity Screen and  Counseling on  07/29/21.           Obesity Screen and Counseling on  06/24/21.           Obesity Screen and Counseling on  06/01/21.           Obesity Screen and Counseling on  05/28/21.           Obesity Screen and Counseling on  05/25/21.           Tobacco Use Screen on  06/24/21.           Tobacco Use Screen on  05/25/21.           Type 2 Diabetes Mellitus Screen on  09/14/21.           Type 2 Diabetes Mellitus Screen on  05/26/21.        Canceled            STD Counseling (If sexually active) on  06/24/21.          Review / Management   Results review      Impression and Plan   Diagnosis     Type 2 diabetes, HbA1c goal < 7% (NMF46-SD E11.9).     HTN (hypertension) (AIH00-ZB I10).     Plan:  Patient with diabetes mellitus.  We will check his A1c today consider Ozempic if his A1c is up significantly.  Continue to work on weight loss.  Blood pressure is reasonable we will not increase his losartan yet.  See his back in 6 months.  Set up a time to see his eye doctor.  Discussed cardiac calcium score.  .

## 2022-02-16 NOTE — NURSING NOTE
Comprehensive Intake Entered On:  5/25/2021 4:04 PM CDT    Performed On:  5/25/2021 4:02 PM CDT by Flakita Carney               Summary   Chief Complaint :   ED concerns.    Weight Measured :   278.8 lb(Converted to: 278 lb 13 oz, 126.462 kg)    Height Measured :   72 in(Converted to: 6 ft 0 in, 182.88 cm)    Body Mass Index :   37.81 kg/m2 (HI)    Body Surface Area :   2.53 m2   Systolic Blood Pressure :   143 mmHg (HI)    Diastolic Blood Pressure :   84 mmHg (HI)    Mean Arterial Pressure :   104 mmHg   Peripheral Pulse Rate :   106 bpm (HI)    BP Method :   Electronic   HR Method :   Electronic   Temperature Tympanic :   97.7 DegF(Converted to: 36.5 DegC)  (LOW)    Flakita Carney - 5/25/2021 4:02 PM CDT   Health Status   Allergies Verified? :   Yes   Medication History Verified? :   Yes   Tobacco Use? :   Former smoker   Flakita Carney - 5/25/2021 4:02 PM CDT   ID Risk Screen   Recent Travel History :   No recent travel   Family Member Travel History :   No recent travel   Other Exposure to Infectious Disease :   Unknown   COVID-19 Testing Status :   No positive COVID-19 test   Flakita Carney - 5/25/2021 4:02 PM CDT   Social History   Social History   (As Of: 5/25/2021 4:04:13 PM CDT)   Tobacco:        Former smoker, quit more than 30 days ago   (Last Updated: 5/25/2021 4:02:23 PM CDT by Flakita Carney)          Electronic Cigarette/Vaping:        Electronic Cigarette Use: Former use, quit more than 90 days ago.   (Last Updated: 5/25/2021 4:02:40 PM CDT by Flakita Carney)

## 2022-02-16 NOTE — NURSING NOTE
Quick Intake Entered On:  7/29/2021 11:06 AM CDT    Performed On:  7/29/2021 11:04 AM CDT by Maxine Beard               Summary   Weight Measured :   262.4 lb(Converted to: 262 lb 6 oz, 119.023 kg)    Height Measured :   72 in(Converted to: 6 ft 0 in, 182.88 cm)    Body Mass Index :   35.58 kg/m2 (HI)    Body Surface Area :   2.46 m2   Systolic Blood Pressure :   114 mmHg   Diastolic Blood Pressure :   77 mmHg   Mean Arterial Pressure :   89 mmHg   Maxine Beard - 7/29/2021 11:04 AM CDT

## 2022-02-16 NOTE — TELEPHONE ENCOUNTER
---------------------  From: Lenore Qureshi CMA (Phone Messages Pool ([id number]_[facility]))   To: Maxine Beard;     Sent: 6/1/2021 1:33:58 PM CDT  Subject: Test strips/pharmacy call     PCP:   Michael MABRY    Time of Call:  1:30pm       Person Calling:  Jim from [facility]      Note:   Calls stating he received script for One Touch lancets and test strips. Said his insurance does not cover that brand. Does cover Accu-check. [facility]. OK to send in for meter and test strips that insurance will pay for?     Last office visit and reason:  _---------------------  From: Maxine Beard   To: Phone Messages Pool ([id number] [facility] [place]);     Sent: 6/1/2021 2:10:32 PM CDT  Subject: RE: Test strips/pharmacy call     yes, please change rx and add meter per insurance   Thank you  ** Submitted: **  Order:Miscellaneous Rx Supply (Accu-check test strips)  See Instructions  testing 2x/ day  Qty:  200 EA        Refills:  3          Substitutions Allowed     Route To [facility]    Signed by Lenore Qureshi CMA  6/1/2021 7:21:00 PM UTC  ** Submitted: **  Order:Miscellaneous Rx Supply (Accu-check lancets)  See Instructions  testing 2x/ day  Qty:  100 EA        Refills:  3          Substitutions Allowed     Route To Pharmacy [facility]    Signed by Lenore Qureshi CMA  6/1/2021 7:22:00 PM UTC  ** Submitted: **  Order:Miscellaneous Rx Supply (Accu-check Meter)  See Instructions  use as directed-testing 2x daily  Qty:  1 EA        Refills:  0          Substitutions Allowed     Route To [facility]    Signed by Lenore Qureshi CMA  6/1/2021 7:22:00 PM UT

## 2022-02-16 NOTE — LETTER
(Inserted Image. Unable to display)            December 08, 2021        [place] [address]        Dear AILIN,    Thank you for selecting [facility]  for your healthcare needs.  Below you will find the results of the recent tests done at our clinic.     All labs acceptable.      Result Name Current Result Previous Result Reference Range   Hgb A1c ((H)) 6.1 12/7/2021 ((H)) 6.5 9/14/2021  - <5.7       Please contact me or my assistant at [phone] if you have any questions.     Sincerely,        Markos Dodge MD        What do your labs mean?  Below is a glossary of commonly ordered labs:  LDL   Bad Cholesterol   HDL   Good Cholesterol  AST/ALT   Liver Function   Cr/Creatinine   Kidney Function  Microalbumin   Kidney Function  BUN   Kidney Function  PSA   Prostate    TSH   Thyroid Hormone  HgbA1c   Diabetes Test   Hgb (Hemoglobin)   Red Blood Cells

## 2022-02-16 NOTE — PROGRESS NOTES
Patient:   AILIN PAUL            MRN: [id number]            FIN: [id number]               Age:   45 years     Sex:  Male     :  1975   Associated Diagnoses:   Type 2 diabetes, HbA1c goal < 7%; HTN (hypertension)   Author:   Markos Dodge MD      Visit Information      Date of Service: 2021 08:18 am  Performing Location: [facility]  Encounter#: [id number]      Primary Care Provider (PCP):  Richard Karimi MD    NPI# [id number]      Referring Provider:  Markos Dodge MD    NPI# [id number]      Chief Complaint   2021 8:22 AM CDT    1  month f/u        History of Present Illness   Doing well Most sugars 120-170  Tolerating meds well         Review of Systems   Constitutional:  Negative.    Eye:  Negative.    Ear/Nose/Mouth/Throat:  Negative.    Respiratory:  Negative.    Cardiovascular:  Negative.    Gastrointestinal:  Negative.    Genitourinary:  Negative.    Hematology/Lymphatics:  Negative.    Endocrine:  Negative.    Immunologic:  Negative.    Musculoskeletal:  Negative.    Integumentary:  Negative.    Neurologic:  Negative.       Health Status   Allergies:    Allergic Reactions (Selected)  Severity Not Documented  No known allergies (No reactions were documented)   Medications:  (Selected)   Prescriptions  Prescribed  Accu-check Meter: Accu-check Meter, See Instructions, Instructions: use as directed-testing 2x daily, Supply, # 1 EA, 0 Refill(s), Type: Maintenance, Pharmacy: [facility], use as directed-testing 2x daily, 72, in, 21 12:30:00 CDT, Height...  Accu-check lancets: Accu-check lancets, See Instructions, Instructions: testing 2x/ day, Supply, # 100 EA, 3 Refill(s), Type: Maintenance, Pharmacy: [facility], testing 2x/ day, 72, in, 21 12:30:00 CDT, Height Measured, 276, lb, 21 12...  Accu-check test strips: Accu-check test strips, See Instructions, Instructions: testing 2x/ day, Supply, # 200  EA, 3 Refill(s), Type: Maintenance, Pharmacy: [facility], testing 2x/ day, 72, in, 06/01/21 12:30:00 CDT, Height Measured, 276, lb, 06/01/2...  losartan 25 mg oral tablet: = 1 tab(s) ( 25 mg ), Oral, daily, # 90 tab(s), 1 Refill(s), Type: Maintenance, Pharmacy: [facility], 1 tab(s) Oral daily, 72, in, 05/28/21 9:02:00 CDT, Height Measured, 278, lb, 05/28/21 9:02:00 CDT, Weight Measured  metFORMIN 500 mg oral tablet, extended release: = 2 tab(s) ( 1,000 mg ), Oral, daily, # 180 tab(s), 1 Refill(s), Type: Maintenance, Pharmacy: [facility], 2 tab(s) Oral daily, 72, in, 06/24/21 8:22:00 CDT, Height Measured, 276.8, lb, 06/24/21 8:22:00 CDT, Weight Measured  sildenafil 20 mg oral tablet: See Instructions, Instructions: 1 tab(s) Oral up to 5 tabs daily as needed, # 50 EA, 11 Refill(s), Type: Maintenance, Pharmacy: [facility], 1 tab(s) Oral up to 5 tabs daily as needed, 72, in, 05/25/21 16:02:00 CDT, Height Pancho...,    Medications          *denotes recorded medication          Accu-check test strips: See Instructions, testing 2x/ day, 200 EA, 3 Refill(s).          Accu-check lancets: See Instructions, testing 2x/ day, 100 EA, 3 Refill(s).          Accu-check Meter: See Instructions, use as directed-testing 2x daily, 1 EA, 0 Refill(s).          losartan 25 mg oral tablet: 25 mg, 1 tab(s), Oral, daily, 90 tab(s), 1 Refill(s).          metFORMIN 500 mg oral tablet, extended release: 1,000 mg, 2 tab(s), Oral, daily, 180 tab(s), 1 Refill(s).          sildenafil 20 mg oral tablet: See Instructions, 1 tab(s) Oral up to 5 tabs daily as needed, 50 EA, 11 Refill(s).       Problem list:    All Problems  ED (erectile dysfunction) / SNOMED CT 0412605959 / Confirmed  Obesity / SNOMED CT 3857514839 / Probable  Type 2 diabetes, HbA1c goal < 7% / SNOMED CT 230258369 / Confirmed  HTN (hypertension) / SNOMED CT 6997561988 / Confirmed       Histories   Past Medical History:    No active or resolved past medical history items have been selected or recorded.   Family History:    No family history items have been selected or recorded.   Procedure history:    No active procedure history items have been selected or recorded.   Social History:        Electronic Cigarette/Vaping Assessment            Electronic Cigarette Use: Former use, quit more than 90 days ago.      Tobacco Assessment            Former smoker, quit more than 30 days ago        Physical Examination   Vital Signs   6/24/2021 8:22 AM CDT Temperature Tympanic 96.9 DegF  LOW    Peripheral Pulse Rate 60 bpm    Pulse Site Radial artery    HR Method Electronic    Systolic Blood Pressure 120 mmHg    Diastolic Blood Pressure 80 mmHg    Mean Arterial Pressure 93 mmHg    BP Site Right arm    BP Method Electronic    Oxygen Saturation 100 %      Measurements from flowsheet : Measurements   6/24/2021 8:22 AM CDT Height Measured - Standard 72 in    Weight Measured - Standard 276.8 lb    BSA 2.52 m2    Body Mass Index 37.54 kg/m2  HI      General:  Alert and oriented, No acute distress.    Respiratory:  Lungs are clear to auscultation, Respirations are non-labored.    Cardiovascular:  Normal rate, Regular rhythm, No murmur.    Gastrointestinal:  Soft, Non-tender, No organomegaly.    Neurologic:  Alert, Oriented.       Health Maintenance      Recommendations     Pending (in the next year)        Due            Alcohol Misuse Screen due  06/24/21  and every 1  year(s)           COVID-19 Vaccine (Moderna) Dose 1 due  06/24/21  One-time only           DM - Communication with Managing Provider due  06/24/21  and every 1  year(s)           DM - Eye Exam due  06/24/21  and every 1  year(s)           Depression Screen due  06/24/21  and every 1  year(s)           HIV Screen (if sexually active) due  06/24/21  and every 1  year(s)           Syphilis Screen (if sexually active) (Male) due  06/24/21  and every 1   year(s)        Due In Future            DM - HgbA1c not due until  08/26/21  and every 3  month(s)           Influenza Vaccine not due until  09/01/21  and every 1  year(s)           Tobacco Use Screen (Male) not due until  05/25/22  and every 1  year(s)           Lipid Disorders Screen not due until  05/25/22  and every 1  year(s)           Type 2 Diabetes Mellitus Screen (Male) not due until  05/26/22  and every 1  year(s)           High Blood Pressure Screen not due until  05/28/22  and every 1  year(s)           DM - Microalbumin not due until  05/28/22  and every 1  year(s)           DM - Foot Exam not due until  05/28/22  and every 1  year(s)           Body Mass Index Check not due until  06/01/22  and every 1  year(s)           Obesity Screen and Counseling not due until  06/01/22  and every 1  year(s)     Satisfied (in the past 1 year)        Satisfied            Body Mass Index Check on  06/01/21.           Body Mass Index Check on  05/28/21.           Body Mass Index Check on  05/25/21.           COVID-19 Vaccine (Moderna) Dose 2 on  05/14/21.           COVID-19 Vaccine (Moderna) Dose 1 on  04/16/21.           DM - Foot Exam on  05/28/21.           DM - HgbA1c on  05/26/21.           DM - Microalbumin on  05/28/21.           DM - Microalbumin on  05/28/21.           High Blood Pressure Screen on  05/28/21.           High Blood Pressure Screen on  05/25/21.           Lipid Disorders Screen on  05/25/21.           Lipid Disorders Screen on  05/25/21.           Lipid Disorders Screen on  05/25/21.           Lipid Disorders Screen on  05/25/21.           Obesity Screen and Counseling on  06/01/21.           Obesity Screen and Counseling on  05/28/21.           Obesity Screen and Counseling on  05/25/21.           Tobacco Use Screen (Male) on  05/25/21.           Type 2 Diabetes Mellitus Screen (Male) on  05/26/21.        Canceled            STD Counseling (if sexually active) (Male) on  06/24/21.           Review / Management   Results review      Impression and Plan   Diagnosis     Type 2 diabetes, HbA1c goal < 7% (ADN55-CF E11.9).     HTN (hypertension) (UXK37-PJ I10).     Plan:  Increase metformim  BMP A1C in 2 months   RTC 6 mo, BMI,Weight loss,exercise,diet discussed.

## 2022-02-16 NOTE — NURSING NOTE
Quick Intake Entered On:  6/1/2021 12:30 PM CDT    Performed On:  6/1/2021 12:30 PM CDT by Maxine Beard               Summary   Weight Measured :   276 lb(Converted to: 276 lb 0 oz, 125.191 kg)    Height Measured :   72 in(Converted to: 6 ft 0 in, 182.88 cm)    Body Mass Index :   37.43 kg/m2 (HI)    Body Surface Area :   2.52 m2   Maxine Beard - 6/1/2021 12:30 PM CDT

## 2022-02-16 NOTE — TELEPHONE ENCOUNTER
---------------------  From: Flakita Carney ([employer] Message Pool ([id number]))   To: [employer] Message Pool ([id number] [facility] [place]);     Sent: 5/26/2021 11:24:44 AM CDT  Subject: Results Follow Up         ---------------------  From: Markos Dodge MD   To: [employer] Message Pool ([id number] [facility] [place]);     Sent: 5/26/2021 9:39:43 AM CDT ! Show up: 5/26/2021 9:39:43 AM CDT  Subject: Results Follow Up   Actions: Notify the patient for appointment    Due Date/Time: 5/26/2021 10:08:00 AM CDT               Reminder Comments:  let him know his blood sugar is quite high. Possibly diabetes. Needs to have A1c done to confirm then see me a few days after to review and discuss next steps    Results:  Date Result Name Ind Value Ref Range   5/25/2021 4:32 PM Glucose Level ((H)) 315 mg/dL (65 - 99)   5/25/2021 4:32 PM Cholesterol  146 mg/dL ( - <200)   5/25/2021 4:32 PM Non-HDL Cholesterol  108 ( - <130)   5/25/2021 4:32 PM HDL ((L)) 38 mg/dL (> OR = 40 - )   5/25/2021 4:32 PM Cholesterol/HDL Ratio  3.8 ( - <5.0)   5/25/2021 4:32 PM LDL  87    5/25/2021 4:32 PM Triglyceride  112 mg/dL ( - <150)Spoke with patient at 11:39am, notified of recommendations.  Transferred to scheduling.

## 2022-03-02 NOTE — LETTER
(Inserted Image. Unable to display)   January 04, 2022    [place] [address]            Dear AILIN,      Thank you for selecting [facility] for your healthcare needs.    Your Healthcare Provider has recommended that you schedule a diabetes management appointment with our Certified Diabetes Educator, Maxine Beard RD, CD, CDE.     Please bring your glucose meter and your blood glucose diary to your appointment.    Available services include:  - Education about diabetes with guidance and support   - Education on the 7 Self Care Behaviors for Diabetes Management:     Healthy Eating   portion control, label readings, carbohydrate recommendations, heart healthy guidelines, meal planning    Being Active - Physical activity guidelines     Monitoring   blood glucose targets, evaluation of blood glucose readings and lab results    Taking Medication   medication management    Problem Solving   discuss any concerns/ questions     Healthy Coping   disease progression and management    Reducing Risks   prevention strategies to help avoid potential complications related to uncontrolled diabetes  - Weight loss strategies      (FYI   Regarding office visit: In some instances, a video visit may be offered as an option.)        To schedule an appointment or if you have further questions, please contact your clinic at [phone].      Powered by [employer]    Sincerely,    Maxine Beard RD, CD, CDE

## 2022-04-04 ENCOUNTER — TRANSFERRED RECORDS (OUTPATIENT)
Dept: HEALTH INFORMATION MANAGEMENT | Facility: CLINIC | Age: 47
End: 2022-04-04

## 2022-04-04 LAB — RETINOPATHY: POSITIVE

## 2022-06-13 ENCOUNTER — TELEPHONE (OUTPATIENT)
Dept: FAMILY MEDICINE | Facility: CLINIC | Age: 47
End: 2022-06-13
Payer: COMMERCIAL

## 2022-06-13 DIAGNOSIS — I10 HTN (HYPERTENSION): Primary | ICD-10-CM

## 2022-06-13 RX ORDER — LOSARTAN POTASSIUM 25 MG/1
25 TABLET ORAL DAILY
Qty: 90 TABLET | Refills: 0 | Status: SHIPPED | OUTPATIENT
Start: 2022-06-13 | End: 2022-06-17

## 2022-06-13 NOTE — TELEPHONE ENCOUNTER
Prescription approved per [facility] Refill Protocol.    Last Written Prescription Date: 12/7/21  Last Fill Quantity: 90,  # refills: 1   Last office visit: 12/7/21 9/4/21 CMP completed    Future Office Visit:   Next 5 appointments (look out 90 days)    Jun 17, 2022  1:40 PM  (Arrive by 1:20 PM)  Provider Visit with Markos Dodge MD  [facility] ([facility] ) [address] [place] [id number] [phone]

## 2022-06-13 NOTE — TELEPHONE ENCOUNTER
Reason for Call:  Medication or medication refill:    Do you use a [facility]?  Name of the pharmacy and phone number for the current request:  [facility]    Name of the medication requested: losartin    Other request: pt has an appt on Fri 6.17.2022 and ran out.    Can we leave a detailed message on this number? YES    Phone number patient can be reached at: Home number on file [phone] (home)    Best Time: anytime    Call taken on 6/13/2022 at 9:34 AM by Katie Yip

## 2022-06-15 RX ORDER — SILDENAFIL CITRATE 20 MG/1
1-5 TABLET ORAL DAILY PRN
COMMUNITY
Start: 2021-05-25

## 2022-06-15 RX ORDER — METFORMIN HCL 500 MG
1000 TABLET, EXTENDED RELEASE 24 HR ORAL DAILY
COMMUNITY
Start: 2021-12-07 | End: 2022-06-17

## 2022-06-16 PROBLEM — E11.9 TYPE 2 DIABETES MELLITUS (H): Status: ACTIVE | Noted: 2022-06-16

## 2022-06-16 PROBLEM — E66.9 OBESITY: Status: ACTIVE | Noted: 2022-06-16

## 2022-06-16 PROBLEM — N52.9 ERECTILE DYSFUNCTION: Status: ACTIVE | Noted: 2022-06-16

## 2022-06-17 ENCOUNTER — OFFICE VISIT (OUTPATIENT)
Dept: FAMILY MEDICINE | Facility: CLINIC | Age: 47
End: 2022-06-17
Payer: COMMERCIAL

## 2022-06-17 VITALS
WEIGHT: 274 LBS | HEART RATE: 76 BPM | DIASTOLIC BLOOD PRESSURE: 80 MMHG | SYSTOLIC BLOOD PRESSURE: 112 MMHG | HEIGHT: 72 IN | BODY MASS INDEX: 37.11 KG/M2

## 2022-06-17 DIAGNOSIS — Z12.11 ENCOUNTER FOR COLORECTAL CANCER SCREENING: ICD-10-CM

## 2022-06-17 DIAGNOSIS — I10 PRIMARY HYPERTENSION: ICD-10-CM

## 2022-06-17 DIAGNOSIS — E11.9 TYPE 2 DIABETES MELLITUS WITHOUT COMPLICATION, UNSPECIFIED WHETHER LONG TERM INSULIN USE (H): Primary | ICD-10-CM

## 2022-06-17 DIAGNOSIS — Z12.12 ENCOUNTER FOR COLORECTAL CANCER SCREENING: ICD-10-CM

## 2022-06-17 DIAGNOSIS — E66.01 MORBID OBESITY (H): ICD-10-CM

## 2022-06-17 LAB
ANION GAP SERPL CALCULATED.3IONS-SCNC: 4 MMOL/L (ref 3–14)
BUN SERPL-MCNC: 23 MG/DL (ref 7–30)
CALCIUM SERPL-MCNC: 9.4 MG/DL (ref 8.5–10.1)
CHLORIDE BLD-SCNC: 106 MMOL/L (ref 94–109)
CHOLEST SERPL-MCNC: 118 MG/DL
CO2 SERPL-SCNC: 28 MMOL/L (ref 20–32)
CREAT SERPL-MCNC: 0.76 MG/DL (ref 0.66–1.25)
CREAT UR-MCNC: 123 MG/DL
FASTING STATUS PATIENT QL REPORTED: NO
GFR SERPL CREATININE-BSD FRML MDRD: >90 ML/MIN/1.73M2
GLUCOSE BLD-MCNC: 164 MG/DL (ref 70–99)
HBA1C MFR BLD: 7.1 % (ref 0–5.6)
HDLC SERPL-MCNC: 35 MG/DL
LDLC SERPL CALC-MCNC: 66 MG/DL
MICROALBUMIN UR-MCNC: 32 MG/L
MICROALBUMIN/CREAT UR: 26.02 MG/G CR (ref 0–17)
NONHDLC SERPL-MCNC: 83 MG/DL
POTASSIUM BLD-SCNC: 4.2 MMOL/L (ref 3.4–5.3)
SODIUM SERPL-SCNC: 138 MMOL/L (ref 133–144)
TRIGL SERPL-MCNC: 87 MG/DL

## 2022-06-17 PROCEDURE — 80048 BASIC METABOLIC PNL TOTAL CA: CPT | Performed by: FAMILY MEDICINE

## 2022-06-17 PROCEDURE — 80061 LIPID PANEL: CPT | Performed by: FAMILY MEDICINE

## 2022-06-17 PROCEDURE — 99214 OFFICE O/P EST MOD 30 MIN: CPT | Performed by: FAMILY MEDICINE

## 2022-06-17 PROCEDURE — 36415 COLL VENOUS BLD VENIPUNCTURE: CPT | Performed by: FAMILY MEDICINE

## 2022-06-17 PROCEDURE — 83036 HEMOGLOBIN GLYCOSYLATED A1C: CPT | Performed by: FAMILY MEDICINE

## 2022-06-17 PROCEDURE — 82043 UR ALBUMIN QUANTITATIVE: CPT | Performed by: FAMILY MEDICINE

## 2022-06-17 RX ORDER — LOSARTAN POTASSIUM 25 MG/1
25 TABLET ORAL DAILY
Qty: 90 TABLET | Refills: 1 | Status: SHIPPED | OUTPATIENT
Start: 2022-06-17 | End: 2022-12-29

## 2022-06-17 RX ORDER — METFORMIN HCL 500 MG
1000 TABLET, EXTENDED RELEASE 24 HR ORAL DAILY
Qty: 90 TABLET | Refills: 1 | Status: SHIPPED | OUTPATIENT
Start: 2022-06-17 | End: 2022-09-22

## 2022-06-17 NOTE — PROGRESS NOTES
Assessment & Plan     Type 2 diabetes mellitus without complication, unspecified whether long term insulin use (H)  Patient with diabetes overall doing well recommended yearly check follow-up with his diabetic educator.  He had his eye check done just a few months ago.  - Albumin Random Urine Quantitative with Creat Ratio; Future  - Basic metabolic panel; Future  - Lipid panel reflex to direct LDL Fasting; Future  - Hemoglobin A1c; Future  - metFORMIN (GLUCOPHAGE XR) 500 MG 24 hr tablet; Take 2 tablets (1,000 mg) by mouth daily    Primary hypertension  Controlled  - losartan (COZAAR) 25 MG tablet; Take 1 tablet (25 mg) by mouth daily    Morbid obesity (H)  We discussed    Encounter for colorectal cancer screening    - SOFYUASTEPHANY([employer])             BMI:   Estimated body mass index is 37.16 kg/m  as calculated from the following:    Height as of this encounter: 1.829 m (6').    Weight as of this encounter: 124.3 kg (274 lb).           Return in about 6 months (around 12/17/2022) for Follow up.    Markos Dodge MD  [facility]    Alexandr Franco is a 46 year old, presenting for the following health issues: Overall doing well.  Overall pleased with the result.  No issues with high or low sugars.  Wife is in good health.  Diabetes and Hypertension      History of Present Illness       Diabetes:   He presents for follow up of diabetes.  He is checking home blood glucose a few times a week. He checks blood glucose before and after meals.  Blood glucose is never over 200 and never under 70. When his blood glucose is low, the patient is asymptomatic for confusion, blurred vision, lethargy and reports not feeling dizzy, shaky, or weak.  He has no concerns regarding his diabetes at this time.  He is not experiencing numbness or burning in feet, excessive thirst, blurry vision, weight changes or redness, sores or blisters on feet.         He eats 0-1 servings of fruits and  vegetables daily.He consumes 1 sweetened beverage(s) daily.He exercises with enough effort to increase his heart rate 10 to 19 minutes per day.  He exercises with enough effort to increase his heart rate 4 days per week.   He is taking medications regularly.     Declines pneumonia vaccines at this time.           Review of Systems   Constitutional, HEENT, cardiovascular, pulmonary, gi and gu systems are negative, except as otherwise noted.      Objective    /80 (BP Location: Right arm, Patient Position: Sitting, Cuff Size: Adult Large)   Pulse 76   Ht 1.829 m (6')   Wt 124.3 kg (274 lb)   BMI 37.16 kg/m    Body mass index is 37.16 kg/m .  Physical Exam   GENERAL: healthy, alert and no distress  NECK: no adenopathy, no asymmetry, masses, or scars and thyroid normal to palpation  RESP: lungs clear to auscultation - no rales, rhonchi or wheezes  CV: regular rate and rhythm, normal S1 S2, no S3 or S4, no murmur, click or rub, no peripheral edema and peripheral pulses strong  ABDOMEN: soft, nontender, no hepatosplenomegaly, no masses and bowel sounds normal  MS: no gross musculoskeletal defects noted, no edema  Diabetic foot exam: normal DP and PT pulses, no trophic changes or ulcerative lesions and normal sensory exam                    .  ..

## 2022-06-17 NOTE — LETTER
June 20, 2022      Salvador S Shaji  [address] [facility] [address]        Dear ,    We are writing to inform you of your test results.    Please make an appointment with Maxine Beard to review or follow up on your test results.  Appointments can be made by calling . We need to look at additional medication  for you diabetes.    Resulted Orders   Albumin Random Urine Quantitative with Creat Ratio   Result Value Ref Range    Creatinine Urine mg/dL 123 mg/dL    Albumin Urine mg/L 32 mg/L    Albumin Urine mg/g Cr 26.02 (H) 0.00 - 17.00 mg/g Cr   Basic metabolic panel   Result Value Ref Range    Sodium 138 133 - 144 mmol/L    Potassium 4.2 3.4 - 5.3 mmol/L    Chloride 106 94 - 109 mmol/L    Carbon Dioxide (CO2) 28 20 - 32 mmol/L    Anion Gap 4 3 - 14 mmol/L    Urea Nitrogen 23 7 - 30 mg/dL    Creatinine 0.76 0.66 - 1.25 mg/dL    Calcium 9.4 8.5 - 10.1 mg/dL    Glucose 164 (H) 70 - 99 mg/dL    GFR Estimate >90 >60 mL/min/1.73m2      Comment:      Effective December 21, 2021 eGFRcr in adults is calculated using the 2021 CKD-EPI creatinine equation which includes age and gender (Santa et al., NEJ, DOI: 10.1056/HECYfd9970591)   Lipid panel reflex to direct LDL Fasting   Result Value Ref Range    Cholesterol 118 <200 mg/dL    Triglycerides 87 <150 mg/dL    Direct Measure HDL 35 (L) >=40 mg/dL    LDL Cholesterol Calculated 66 <=100 mg/dL    Non HDL Cholesterol 83 <130 mg/dL    Patient Fasting > 8hrs? No     Narrative    Cholesterol  Desirable:  <200 mg/dL    Triglycerides  Normal:  Less than 150 mg/dL  Borderline High:  150-199 mg/dL  High:  200-499 mg/dL  Very High:  Greater than or equal to 500 mg/dL    Direct Measure HDL  Female:  Greater than or equal to 50 mg/dL   Male:  Greater than or equal to 40 mg/dL    LDL Cholesterol  Desirable:  <100mg/dL  Above Desirable:  100-129 mg/dL   Borderline High:  130-159 mg/dL   High:  160-189 mg/dL   Very High:  >= 190 mg/dL    Non HDL Cholesterol  Desirable:   130 mg/dL  Above Desirable:  130-159 mg/dL  Borderline High:  160-189 mg/dL  High:  190-219 mg/dL  Very High:  Greater than or equal to 220 mg/dL   Hemoglobin A1c   Result Value Ref Range    Hemoglobin A1C 7.1 (H) 0.0 - 5.6 %      Comment:      Normal <5.7%   Prediabetes 5.7-6.4%    Diabetes 6.5% or higher     Note: Adopted from ADA consensus guidelines.       If you have any questions or concerns, please call the clinic at the number listed above.       Sincerely,      Markos Dodge MD

## 2022-09-09 DIAGNOSIS — E11.9 TYPE 2 DIABETES MELLITUS WITHOUT COMPLICATION, UNSPECIFIED WHETHER LONG TERM INSULIN USE (H): ICD-10-CM

## 2022-09-09 RX ORDER — METFORMIN HCL 500 MG
1000 TABLET, EXTENDED RELEASE 24 HR ORAL DAILY
Qty: 90 TABLET | Refills: 1 | OUTPATIENT
Start: 2022-09-09

## 2022-09-09 NOTE — TELEPHONE ENCOUNTER
Too soon to refill - patient should have refill on file.  Filled 6/17/2022 for 90 tab and 1 refill.  [facility] [place] Receipt confirmed by pharmacy (6/17/2022  1:41 PM CDT)

## 2022-09-22 DIAGNOSIS — E11.9 TYPE 2 DIABETES MELLITUS WITHOUT COMPLICATION, UNSPECIFIED WHETHER LONG TERM INSULIN USE (H): ICD-10-CM

## 2022-09-22 RX ORDER — METFORMIN HCL 500 MG
1000 TABLET, EXTENDED RELEASE 24 HR ORAL DAILY
Qty: 180 TABLET | Refills: 0 | Status: SHIPPED | OUTPATIENT
Start: 2022-09-22 | End: 2022-12-29

## 2022-09-22 NOTE — TELEPHONE ENCOUNTER
"Prescription approved per [facility] Refill Protocol.    Last Written Prescription Date:  6/17/2022  Last Fill Quantity: 90,  # refills: 1   Last office visit provider:  6/17/2022     Requested Prescriptions   Pending Prescriptions Disp Refills     metFORMIN (GLUCOPHAGE XR) 500 MG 24 hr tablet [Pharmacy Med Name: metformin  mg tablet,extended release 24 hr] 90 tablet 1     Sig: Take 2 tablets (1,000 mg) by mouth daily       Biguanide Agents Passed - 9/22/2022  8:04 AM        Passed - Patient is age 10 or older        Passed - Patient has documented A1c within the specified period of time.     If HgbA1C is 8 or greater, it needs to be on file within the past 3 months.  If less than 8, must be on file within the past 6 months.     Recent Labs   Lab Test 06/17/22  1349   A1C 7.1*             Passed - Patient's CR is NOT>1.4 OR Patient's EGFR is NOT<45 within past 12 mos.     Recent Labs   Lab Test 06/17/22  1349   GFRESTIMATED >90       Recent Labs   Lab Test 06/17/22  1349   CR 0.76             Passed - Patient does NOT have a diagnosis of CHF.        Passed - Medication is active on med list        Passed - Recent (6 mo) or future (30 days) visit within the authorizing provider's specialty     Patient had office visit in the last 6 months or has a visit in the next 30 days with authorizing provider or within the authorizing provider's specialty.  See \"Patient Info\" tab in inbasket, or \"Choose Columns\" in Meds & Orders section of the refill encounter.                 Kofi Fair RN 09/22/22 8:53 AM  "

## 2022-12-29 DIAGNOSIS — E11.9 TYPE 2 DIABETES MELLITUS WITHOUT COMPLICATION, UNSPECIFIED WHETHER LONG TERM INSULIN USE (H): ICD-10-CM

## 2022-12-29 DIAGNOSIS — I10 PRIMARY HYPERTENSION: ICD-10-CM

## 2022-12-29 RX ORDER — METFORMIN HCL 500 MG
1000 TABLET, EXTENDED RELEASE 24 HR ORAL DAILY
Qty: 60 TABLET | Refills: 0 | Status: SHIPPED | OUTPATIENT
Start: 2022-12-29 | End: 2022-12-30

## 2022-12-29 RX ORDER — LOSARTAN POTASSIUM 25 MG/1
25 TABLET ORAL DAILY
Qty: 30 TABLET | Refills: 0 | Status: SHIPPED | OUTPATIENT
Start: 2022-12-29 | End: 2022-12-30

## 2022-12-29 NOTE — TELEPHONE ENCOUNTER
"TC- please contact patient. 30 day supply of medication sent to pharmacy. Patient is due for visit.        Last office visit: 6/17/2022   Return in about 6 months (around 12/17/2022) for Follow up.    Future Appointments 12/29/2022 - 6/27/2023    None          Requested Prescriptions   Pending Prescriptions Disp Refills     losartan (COZAAR) 25 MG tablet 90 tablet 1     Sig: Take 1 tablet (25 mg) by mouth daily       Angiotensin-II Receptors Passed - 12/29/2022  3:14 PM        Passed - Last blood pressure under 140/90 in past 12 months     BP Readings from Last 3 Encounters:   06/17/22 112/80   12/07/21 133/81   07/29/21 114/77                 Passed - Recent (12 mo) or future (30 days) visit within the authorizing provider's specialty     Patient has had an office visit with the authorizing provider or a provider within the authorizing providers department within the previous 12 mos or has a future within next 30 days. See \"Patient Info\" tab in inbasket, or \"Choose Columns\" in Meds & Orders section of the refill encounter.              Passed - Medication is active on med list        Passed - Patient is age 18 or older        Passed - Normal serum creatinine on file in past 12 months     Recent Labs   Lab Test 06/17/22  1349   CR 0.76       Ok to refill medication if creatinine is low          Passed - Normal serum potassium on file in past 12 months     Recent Labs   Lab Test 06/17/22  1349   POTASSIUM 4.2                       metFORMIN (GLUCOPHAGE XR) 500 MG 24 hr tablet 180 tablet 0     Sig: Take 2 tablets (1,000 mg) by mouth daily       Biguanide Agents Failed - 12/29/2022  3:14 PM        Failed - Patient has documented A1c within the specified period of time.     If HgbA1C is 8 or greater, it needs to be on file within the past 3 months.  If less than 8, must be on file within the past 6 months.     Recent Labs   Lab Test 06/17/22  1349   A1C 7.1*             Failed - Recent (6 mo) or future (30 days) visit " "within the authorizing provider's specialty     Patient had office visit in the last 6 months or has a visit in the next 30 days with authorizing provider or within the authorizing provider's specialty.  See \"Patient Info\" tab in inbasket, or \"Choose Columns\" in Meds & Orders section of the refill encounter.            Passed - Patient is age 10 or older        Passed - Patient's CR is NOT>1.4 OR Patient's EGFR is NOT<45 within past 12 mos.     Recent Labs   Lab Test 06/17/22  1349   GFRESTIMATED >90       Recent Labs   Lab Test 06/17/22  1349   CR 0.76             Passed - Patient does NOT have a diagnosis of CHF.        Passed - Medication is active on med list                   "

## 2022-12-29 NOTE — TELEPHONE ENCOUNTER
Medication Question or Refill    Contacts       Type Contact Phone/Fax    12/29/2022 02:29 PM CST Phone (Incoming) Salvador Girard (Self) [phone] (H)          What medication are you calling about (include dose and sig)?: Metformin 500 mg twice a day  Losartan 25 mg once a day    Controlled Substance Agreement on file:   CSA -- Patient Level:    CSA: None found at the patient level.       Who prescribed the medication?: Dr. Dodge    Do you need a refill? Yes: for both meds    When did you use the medication last? 12/28/22    Patient offered an appointment? Yes: but did not schedule    Do you have any questions or concerns?  No    Preferred Pharmacy:   [facility] [address] [address]  Phone: [phone] Fax: [phone]      Okay to leave a detailed message?: Yes at Cell number on file:    Telephone Information:   Mobile [phone]

## 2022-12-30 RX ORDER — LOSARTAN POTASSIUM 25 MG/1
25 TABLET ORAL DAILY
Qty: 30 TABLET | Refills: 0 | Status: SHIPPED | OUTPATIENT
Start: 2022-12-30 | End: 2023-02-15

## 2022-12-30 RX ORDER — METFORMIN HCL 500 MG
1000 TABLET, EXTENDED RELEASE 24 HR ORAL DAILY
Qty: 60 TABLET | Refills: 0 | Status: SHIPPED | OUTPATIENT
Start: 2022-12-30 | End: 2023-01-27

## 2022-12-30 NOTE — TELEPHONE ENCOUNTER
Called and got pt scheduled for when TFS got back / Informed that his two medications were filled for 30 days at his pharmacy. Pt scheduled with TFS 2.17.22 BUT pt expressed that he will run out of medication before he seems TFS in Feb.     I did inform pt that about a week prior to him running out to give us a call that way we can bridge medication if able to. Pt was very understanding and scheduled his appt.

## 2023-01-27 DIAGNOSIS — E11.9 TYPE 2 DIABETES MELLITUS WITHOUT COMPLICATION, UNSPECIFIED WHETHER LONG TERM INSULIN USE (H): ICD-10-CM

## 2023-01-27 RX ORDER — METFORMIN HCL 500 MG
1000 TABLET, EXTENDED RELEASE 24 HR ORAL DAILY
Qty: 60 TABLET | Refills: 0 | Status: SHIPPED | OUTPATIENT
Start: 2023-01-27 | End: 2023-02-15

## 2023-01-27 NOTE — TELEPHONE ENCOUNTER
Pt was wondering if he can get a refill until his next visit which is on 02/17/2023.     Please call Pt with any questions/concerns.    Lynda Silver

## 2023-01-27 NOTE — TELEPHONE ENCOUNTER
Routing to provider as refill request for review/approval because:    LOV 6/17/22  Upcoming office visit: 2/17/23    Rossi Hart RN  [facility]

## 2023-02-17 ENCOUNTER — OFFICE VISIT (OUTPATIENT)
Dept: FAMILY MEDICINE | Facility: CLINIC | Age: 48
End: 2023-02-17
Payer: COMMERCIAL

## 2023-02-17 VITALS
TEMPERATURE: 97.7 F | WEIGHT: 290 LBS | DIASTOLIC BLOOD PRESSURE: 82 MMHG | BODY MASS INDEX: 39.28 KG/M2 | HEIGHT: 72 IN | HEART RATE: 80 BPM | SYSTOLIC BLOOD PRESSURE: 110 MMHG

## 2023-02-17 DIAGNOSIS — E66.01 MORBID OBESITY (H): ICD-10-CM

## 2023-02-17 DIAGNOSIS — Z12.11 COLON CANCER SCREENING: ICD-10-CM

## 2023-02-17 DIAGNOSIS — I10 PRIMARY HYPERTENSION: ICD-10-CM

## 2023-02-17 DIAGNOSIS — E11.9 TYPE 2 DIABETES MELLITUS WITHOUT COMPLICATION, WITHOUT LONG-TERM CURRENT USE OF INSULIN (H): Primary | ICD-10-CM

## 2023-02-17 LAB — HBA1C MFR BLD: 8.4 % (ref 0–5.6)

## 2023-02-17 PROCEDURE — 83036 HEMOGLOBIN GLYCOSYLATED A1C: CPT | Performed by: FAMILY MEDICINE

## 2023-02-17 PROCEDURE — 36415 COLL VENOUS BLD VENIPUNCTURE: CPT | Performed by: FAMILY MEDICINE

## 2023-02-17 PROCEDURE — 99214 OFFICE O/P EST MOD 30 MIN: CPT | Performed by: FAMILY MEDICINE

## 2023-02-17 RX ORDER — METFORMIN HCL 500 MG
1000 TABLET, EXTENDED RELEASE 24 HR ORAL DAILY
Qty: 180 TABLET | Refills: 1 | Status: SHIPPED | OUTPATIENT
Start: 2023-02-17 | End: 2023-08-20

## 2023-02-17 RX ORDER — LOSARTAN POTASSIUM 25 MG/1
25 TABLET ORAL DAILY
Qty: 90 TABLET | Refills: 1 | Status: SHIPPED | OUTPATIENT
Start: 2023-02-17 | End: 2023-10-02

## 2023-02-17 NOTE — PROGRESS NOTES
Assessment & Plan     Type 2 diabetes mellitus without complication, without long-term current use of insulin (H)  Last A1c was up a bit we will recheck today.  We will get him set up with diabetic education consider Ozempic.  Cholesterol profile looks very good we will hold on statin for right now discussed cardiac calcium score with him  - Hemoglobin A1c  - blood glucose (NO BRAND SPECIFIED) test strip; Testing once daily  - blood glucose (NO BRAND SPECIFIED) lancets standard; Testing once daily    Primary hypertension  Under excellent control with losartan  - losartan (COZAAR) 25 MG tablet; Take 1 tablet (25 mg) by mouth daily    Morbid obesity (H)  We will trial Ozempic if he qualifies    Colon cancer screening    - COLOGUARD([employer])             BMI:   Estimated body mass index is 39.33 kg/m  as calculated from the following:    Height as of this encounter: 1.829 m (6').    Weight as of this encounter: 131.5 kg (290 lb).           Return in about 6 months (around 8/17/2023) for Follow up.    Markos Dodge MD  [facility]    Alexandr Franco is a 47 year old, presenting for the following health issues: Overall patient's been doing well.  He works in [employer].  Wife is in good health.  Sugars run between low 100s and upper 100s.  Up-to-date on his eye checks.  Diabetes      History of Present Illness       Diabetes:   He presents for follow up of diabetes.  He is checking home blood glucose a few times a week. He checks blood glucose before and after meals.  Blood glucose is never over 200 and never under 70. When his blood glucose is low, the patient is asymptomatic for confusion, blurred vision, lethargy and reports not feeling dizzy, shaky, or weak.  He has no concerns regarding his diabetes at this time.  He is not experiencing numbness or burning in feet, excessive thirst, blurry vision, weight changes or redness, sores or blisters on feet.         He eats  0-1 servings of fruits and vegetables daily.He consumes 0 sweetened beverage(s) daily.He exercises with enough effort to increase his heart rate 10 to 19 minutes per day.  He exercises with enough effort to increase his heart rate 3 or less days per week.   He is taking medications regularly.       Does not check BP at home.     Patient Active Problem List   Diagnosis     Erectile dysfunction     Obesity     Type 2 diabetes mellitus (H)     Morbid obesity (H)     Current Outpatient Medications   Medication     blood glucose (NO BRAND SPECIFIED) lancets standard     blood glucose (NO BRAND SPECIFIED) test strip     losartan (COZAAR) 25 MG tablet     metFORMIN (GLUCOPHAGE XR) 500 MG 24 hr tablet     sildenafil (REVATIO) 20 MG tablet     No current facility-administered medications for this visit.         Review of Systems   Constitutional, HEENT, cardiovascular, pulmonary, gi and gu systems are negative, except as otherwise noted.      Objective    /82 (BP Location: Right arm, Patient Position: Sitting, Cuff Size: Adult Large)   Pulse 80   Temp 97.7  F (36.5  C) (Temporal)   Ht 1.829 m (6')   Wt 131.5 kg (290 lb)   BMI 39.33 kg/m    Body mass index is 39.33 kg/m .  Physical Exam   GENERAL: healthy, alert and no distress  NECK: no adenopathy, no asymmetry, masses, or scars and thyroid normal to palpation  RESP: lungs clear to auscultation - no rales, rhonchi or wheezes  CV: regular rate and rhythm, normal S1 S2, no S3 or S4, no murmur, click or rub, no peripheral edema and peripheral pulses strong  ABDOMEN: soft, nontender, no hepatosplenomegaly, no masses and bowel sounds normal  MS: no gross musculoskeletal defects noted, no edema    Office Visit on 06/17/2022   Component Date Value Ref Range Status     Creatinine Urine mg/dL 06/17/2022 123  mg/dL Final     Albumin Urine mg/L 06/17/2022 32  mg/L Final     Albumin Urine mg/g Cr 06/17/2022 26.02 (H)  0.00 - 17.00 mg/g Cr Final     Sodium 06/17/2022 138  133  - 144 mmol/L Final     Potassium 06/17/2022 4.2  3.4 - 5.3 mmol/L Final     Chloride 06/17/2022 106  94 - 109 mmol/L Final     Carbon Dioxide (CO2) 06/17/2022 28  20 - 32 mmol/L Final     Anion Gap 06/17/2022 4  3 - 14 mmol/L Final     Urea Nitrogen 06/17/2022 23  7 - 30 mg/dL Final     Creatinine 06/17/2022 0.76  0.66 - 1.25 mg/dL Final     Calcium 06/17/2022 9.4  8.5 - 10.1 mg/dL Final     Glucose 06/17/2022 164 (H)  70 - 99 mg/dL Final     GFR Estimate 06/17/2022 >90  >60 mL/min/1.73m2 Final    Effective December 21, 2021 eGFRcr in adults is calculated using the 2021 CKD-EPI creatinine equation which includes age and gender (Santa et al., NE, DOI: 10.1056/HPXNix8164735)     Cholesterol 06/17/2022 118  <200 mg/dL Final     Triglycerides 06/17/2022 87  <150 mg/dL Final     Direct Measure HDL 06/17/2022 35 (L)  >=40 mg/dL Final     LDL Cholesterol Calculated 06/17/2022 66  <=100 mg/dL Final     Non HDL Cholesterol 06/17/2022 83  <130 mg/dL Final     Patient Fasting > 8hrs? 06/17/2022 No   Final     Hemoglobin A1C 06/17/2022 7.1 (H)  0.0 - 5.6 % Final    Normal <5.7%   Prediabetes 5.7-6.4%    Diabetes 6.5% or higher     Note: Adopted from ADA consensus guidelines.

## 2023-02-17 NOTE — LETTER
February 17, 2023      Salvador Girard  [address] [place] [id number]        Dear ,    We are writing to inform you of your test results.    Test results indicate you may require additional follow up, see comment below.  Your A1c continues to go up please set up a visit with Opal Beard as we discussed.    Resulted Orders   Hemoglobin A1c   Result Value Ref Range    Hemoglobin A1C 8.4 (H) 0.0 - 5.6 %      Comment:      Normal <5.7%   Prediabetes 5.7-6.4%    Diabetes 6.5% or higher     Note: Adopted from ADA consensus guidelines.       If you have any questions or concerns, please call the clinic at the number listed above.       Sincerely,      Markos Dodge MD

## 2023-08-20 DIAGNOSIS — E11.9 TYPE 2 DIABETES MELLITUS WITHOUT COMPLICATIONS (H): ICD-10-CM

## 2023-08-20 RX ORDER — METFORMIN HCL 500 MG
1000 TABLET, EXTENDED RELEASE 24 HR ORAL DAILY
Qty: 180 TABLET | Refills: 1 | Status: SHIPPED | OUTPATIENT
Start: 2023-08-20 | End: 2024-01-30

## 2023-08-20 NOTE — TELEPHONE ENCOUNTER
"Routing refill request to provider for review/approval because:  Labs not current:  A1C, GFR, CR  Patient needs to be seen because it has been more than 6 months since last office visit.    Last Written Prescription Date:  2/17/23  Last Fill Quantity: 180,  # refills: 1   Last office visit provider:   2/17/23    Requested Prescriptions   Pending Prescriptions Disp Refills    metFORMIN (GLUCOPHAGE XR) 500 MG 24 hr tablet [Pharmacy Med Name: metformin  mg tablet,extended release 24 hr] 180 tablet 1     Sig: Take 2 tablets (1,000 mg) by mouth daily       Biguanide Agents Failed - 8/20/2023  8:01 AM        Failed - Patient has documented A1c within the specified period of time.     If HgbA1C is 8 or greater, it needs to be on file within the past 3 months.  If less than 8, must be on file within the past 6 months.     Recent Labs   Lab Test 02/17/23  1451   A1C 8.4*             Failed - Patient's CR is NOT>1.4 OR Patient's EGFR is NOT<45 within past 12 mos.     Recent Labs   Lab Test 06/17/22  1349   GFRESTIMATED >90       Recent Labs   Lab Test 06/17/22  1349   CR 0.76             Failed - Recent (6 mo) or future (30 days) visit within the authorizing provider's specialty     Patient had office visit in the last 6 months or has a visit in the next 30 days with authorizing provider or within the authorizing provider's specialty.  See \"Patient Info\" tab in inbasket, or \"Choose Columns\" in Meds & Orders section of the refill encounter.            Passed - Patient is age 10 or older        Passed - Patient does NOT have a diagnosis of CHF.        Passed - Medication is active on med list             Swati Dowell RN 08/20/23 1:47 PM  "

## 2024-01-26 DIAGNOSIS — E11.9 CONTROLLED TYPE 2 DIABETES MELLITUS WITHOUT COMPLICATION, WITHOUT LONG-TERM CURRENT USE OF INSULIN (H): ICD-10-CM

## 2024-01-26 DIAGNOSIS — I10 PRIMARY HYPERTENSION: ICD-10-CM

## 2024-01-30 RX ORDER — METFORMIN HCL 500 MG
1000 TABLET, EXTENDED RELEASE 24 HR ORAL DAILY
Qty: 180 TABLET | Refills: 0 | Status: SHIPPED | OUTPATIENT
Start: 2024-01-30 | End: 2024-03-08

## 2024-01-30 RX ORDER — LOSARTAN POTASSIUM 25 MG/1
25 TABLET ORAL DAILY
Qty: 30 TABLET | Refills: 0 | Status: SHIPPED | OUTPATIENT
Start: 2024-01-30 | End: 2024-03-08

## 2024-03-08 ENCOUNTER — OFFICE VISIT (OUTPATIENT)
Dept: FAMILY MEDICINE | Facility: CLINIC | Age: 49
End: 2024-03-08
Payer: COMMERCIAL

## 2024-03-08 VITALS
WEIGHT: 286.6 LBS | OXYGEN SATURATION: 99 % | BODY MASS INDEX: 38.82 KG/M2 | SYSTOLIC BLOOD PRESSURE: 112 MMHG | TEMPERATURE: 97.3 F | RESPIRATION RATE: 14 BRPM | HEIGHT: 72 IN | DIASTOLIC BLOOD PRESSURE: 68 MMHG | HEART RATE: 80 BPM

## 2024-03-08 DIAGNOSIS — K92.2 GASTROINTESTINAL HEMORRHAGE, UNSPECIFIED GASTROINTESTINAL HEMORRHAGE TYPE: Primary | ICD-10-CM

## 2024-03-08 DIAGNOSIS — I10 PRIMARY HYPERTENSION: ICD-10-CM

## 2024-03-08 DIAGNOSIS — E11.9 CONTROLLED TYPE 2 DIABETES MELLITUS WITHOUT COMPLICATION, WITHOUT LONG-TERM CURRENT USE OF INSULIN (H): ICD-10-CM

## 2024-03-08 LAB
ANION GAP SERPL CALCULATED.3IONS-SCNC: 7 MMOL/L (ref 7–15)
BASOPHILS # BLD AUTO: 0 10E3/UL (ref 0–0.2)
BASOPHILS NFR BLD AUTO: 1 %
BUN SERPL-MCNC: 18.1 MG/DL (ref 6–20)
CALCIUM SERPL-MCNC: 9.2 MG/DL (ref 8.6–10)
CHLORIDE SERPL-SCNC: 104 MMOL/L (ref 98–107)
CHOLEST SERPL-MCNC: 129 MG/DL
CREAT SERPL-MCNC: 0.85 MG/DL (ref 0.67–1.17)
CREAT UR-MCNC: 234 MG/DL
DEPRECATED HCO3 PLAS-SCNC: 28 MMOL/L (ref 22–29)
EGFRCR SERPLBLD CKD-EPI 2021: >90 ML/MIN/1.73M2
EOSINOPHIL # BLD AUTO: 0.1 10E3/UL (ref 0–0.7)
EOSINOPHIL NFR BLD AUTO: 2 %
ERYTHROCYTE [DISTWIDTH] IN BLOOD BY AUTOMATED COUNT: 14.2 % (ref 10–15)
FASTING STATUS PATIENT QL REPORTED: ABNORMAL
GLUCOSE SERPL-MCNC: 178 MG/DL (ref 70–99)
HBA1C MFR BLD: 8.2 % (ref 0–5.6)
HCT VFR BLD AUTO: 29.8 % (ref 40–53)
HDLC SERPL-MCNC: 33 MG/DL
HGB BLD-MCNC: 10.1 G/DL (ref 13.3–17.7)
IMM GRANULOCYTES # BLD: 0 10E3/UL
IMM GRANULOCYTES NFR BLD: 0 %
LDLC SERPL CALC-MCNC: 72 MG/DL
LYMPHOCYTES # BLD AUTO: 1.5 10E3/UL (ref 0.8–5.3)
LYMPHOCYTES NFR BLD AUTO: 37 %
MCH RBC QN AUTO: 30.5 PG (ref 26.5–33)
MCHC RBC AUTO-ENTMCNC: 33.9 G/DL (ref 31.5–36.5)
MCV RBC AUTO: 90 FL (ref 78–100)
MICROALBUMIN UR-MCNC: 15.5 MG/L
MICROALBUMIN/CREAT UR: 6.62 MG/G CR (ref 0–17)
MONOCYTES # BLD AUTO: 0.3 10E3/UL (ref 0–1.3)
MONOCYTES NFR BLD AUTO: 6 %
NEUTROPHILS # BLD AUTO: 2.3 10E3/UL (ref 1.6–8.3)
NEUTROPHILS NFR BLD AUTO: 55 %
NONHDLC SERPL-MCNC: 96 MG/DL
PLATELET # BLD AUTO: 169 10E3/UL (ref 150–450)
POTASSIUM SERPL-SCNC: 4.3 MMOL/L (ref 3.4–5.3)
RBC # BLD AUTO: 3.31 10E6/UL (ref 4.4–5.9)
SODIUM SERPL-SCNC: 139 MMOL/L (ref 135–145)
TRIGL SERPL-MCNC: 121 MG/DL
WBC # BLD AUTO: 4.1 10E3/UL (ref 4–11)

## 2024-03-08 PROCEDURE — 80061 LIPID PANEL: CPT | Performed by: FAMILY MEDICINE

## 2024-03-08 PROCEDURE — 99214 OFFICE O/P EST MOD 30 MIN: CPT | Performed by: FAMILY MEDICINE

## 2024-03-08 PROCEDURE — 36415 COLL VENOUS BLD VENIPUNCTURE: CPT | Performed by: FAMILY MEDICINE

## 2024-03-08 PROCEDURE — 80048 BASIC METABOLIC PNL TOTAL CA: CPT | Performed by: FAMILY MEDICINE

## 2024-03-08 PROCEDURE — 82570 ASSAY OF URINE CREATININE: CPT | Performed by: FAMILY MEDICINE

## 2024-03-08 PROCEDURE — 85025 COMPLETE CBC W/AUTO DIFF WBC: CPT | Mod: QW | Performed by: FAMILY MEDICINE

## 2024-03-08 PROCEDURE — 82043 UR ALBUMIN QUANTITATIVE: CPT | Performed by: FAMILY MEDICINE

## 2024-03-08 PROCEDURE — 83036 HEMOGLOBIN GLYCOSYLATED A1C: CPT | Performed by: FAMILY MEDICINE

## 2024-03-08 RX ORDER — FERROUS SULFATE 325(65) MG
325 TABLET ORAL
Qty: 30 TABLET | Refills: 0 | Status: SHIPPED | OUTPATIENT
Start: 2024-03-08

## 2024-03-08 RX ORDER — METFORMIN HYDROCHLORIDE 750 MG/1
750 TABLET, EXTENDED RELEASE ORAL 2 TIMES DAILY WITH MEALS
Qty: 60 TABLET | Refills: 11 | Status: SHIPPED | OUTPATIENT
Start: 2024-03-08

## 2024-03-08 RX ORDER — METFORMIN HCL 500 MG
1000 TABLET, EXTENDED RELEASE 24 HR ORAL DAILY
Qty: 180 TABLET | Refills: 0 | Status: CANCELLED | OUTPATIENT
Start: 2024-03-08

## 2024-03-08 RX ORDER — LOSARTAN POTASSIUM 25 MG/1
25 TABLET ORAL DAILY
Qty: 90 TABLET | Refills: 0 | Status: SHIPPED | OUTPATIENT
Start: 2024-03-08 | End: 2024-07-11

## 2024-03-08 NOTE — PROGRESS NOTES
Assessment & Plan     Gastrointestinal hemorrhage, unspecified gastrointestinal hemorrhage type  Patient with significant GI bleed will have him hold his losartan until his hemoglobin is back to normal.  Will start him on iron for the next month.  Will get him set up for upper and lower endoscopy given the magnitude of his blood loss.  If he has any return of symptoms he is to go to the ER emergency immediately.  We reviewed his hospital discharge summary and med rec.  - CBC with Platelets & Differential; Future  - Adult GI  Referral - Procedure Only; Future  - ferrous sulfate (FEROSUL) 325 (65 Fe) MG tablet; Take 1 tablet (325 mg) by mouth daily (with breakfast)  - CBC with Platelets & Differential    Primary hypertension  Holding losartan for now  - losartan (COZAAR) 25 MG tablet; Take 1 tablet (25 mg) by mouth daily    Controlled type 2 diabetes mellitus without complication, without long-term current use of insulin (H)  Last A1c was 8.4 most of his blood sugars around 150 will increase his metformin get him set up with diabetic education consider Ozempic see what today's labs look like.  Follow-up with us a few weeks after he sees diabetic education will look at repeating his hemoglobin most likely then.  - Hemoglobin A1c; Future  - Albumin Random Urine Quantitative with Creat Ratio; Future  - Adult Diabetes Education  Referral; Future  - metFORMIN (GLUCOPHAGE-XR) 750 MG 24 hr tablet; Take 1 tablet (750 mg) by mouth 2 times daily (with meals)  - Albumin Random Urine Quantitative with Creat Ratio  - Basic metabolic panel  - Lipid panel reflex to direct LDL Fasting  - Hemoglobin A1c          MED REC REQUIRED  Post Medication Reconciliation Status:   BMI  Estimated body mass index is 38.87 kg/m  as calculated from the following:    Height as of this encounter: 1.829 m (6').    Weight as of this encounter: 130 kg (286 lb 9.6 oz).             Alexandr Franco is a 48 year old, presenting for the  following health issues: Patient had a significant GI bleed last weekend.  He dropped his hemoglobin to 9.8.  He is very symptomatic.  CT scan was negative.  He has an outpatient endoscopy that he needs.  He notes he feels fine now.  No abdominal pain cramping stools are normal no chest pain shortness of breath.  Hospital F/U ([facility] - )        3/8/2024     7:43 AM   Additional Questions   Roomed by Ileana CANALES CMA   Accompanied by None       Hospital Follow-up Visit:    Hospital/Nursing Home/IP Rehab Facility:  [facility]   Date of Admission: 03/03/2024  Date of Discharge: 03/04/2024  Reason(s) for Admission: Rectal Bleeding    Was your hospitalization related to COVID-19? No   Problems taking medications regularly:  None  Medication changes since discharge: None  Problems adhering to non-medication therapy:  None    Summary of hospitalization:    Diagnostic Tests/Treatments reviewed.  Follow up needed:   Other Healthcare Providers Involved in Patient s Care:           Update since discharge:          Plan of care communicated with                    Review of Systems  Constitutional, HEENT, cardiovascular, pulmonary, gi and gu systems are negative, except as otherwise noted.      Objective    /68   Pulse 80   Temp 97.3  F (36.3  C)   Resp 14   Ht 1.829 m (6')   Wt 130 kg (286 lb 9.6 oz)   SpO2 99%   BMI 38.87 kg/m    Body mass index is 38.87 kg/m .  Physical Exam   GENERAL: alert and no distress  NECK: no adenopathy, no asymmetry, masses, or scars  RESP: lungs clear to auscultation - no rales, rhonchi or wheezes  CV: regular rate and rhythm, normal S1 S2, no S3 or S4, no murmur, click or rub, no peripheral edema  ABDOMEN: soft, nontender, no hepatosplenomegaly, no masses and bowel sounds normal  MS: no gross musculoskeletal defects noted, no edema            Signed Electronically by: Markos Dodge MD

## 2024-03-08 NOTE — LETTER
March 9, 2024      Salvador S Shjai  [address] [facility] [id number]        Dear ,    We are writing to inform you of your test results.    Test results indicate you may require additional follow up, see comment below.  Your A1C is still elevated. Please set up follow up with  Maxine Beard as we discussed and see me in 1 month.    Resulted Orders   Albumin Random Urine Quantitative with Creat Ratio   Result Value Ref Range    Creatinine Urine mg/dL 234.0 mg/dL      Comment:      The reference ranges have not been established in urine creatinine. The results should be integrated into the clinical context for interpretation.    Albumin Urine mg/L 15.5 mg/L      Comment:      The reference ranges have not been established in urine albumin. The results should be integrated into the clinical context for interpretation.    Albumin Urine mg/g Cr 6.62 0.00 - 17.00 mg/g Cr      Comment:      Microalbuminuria is defined as an albumin:creatinine ratio of 17 to 299 for males and 25 to 299 for females. A ratio of albumin:creatinine of 300 or higher is indicative of overt proteinuria.  Due to biologic variability, positive results should be confirmed by a second, first-morning random or 24-hour timed urine specimen. If there is discrepancy, a third specimen is recommended. When 2 out of 3 results are in the microalbuminuria range, this is evidence for incipient nephropathy and warrants increased efforts at glucose control, blood pressure control, and institution of therapy with an angiotensin-converting-enzyme (ACE) inhibitor (if the patient can tolerate it).     Basic metabolic panel   Result Value Ref Range    Sodium 139 135 - 145 mmol/L      Comment:      Reference intervals for this test were updated on 09/26/2023 to more accurately reflect our healthy population. There may be differences in the flagging of prior results with similar values performed with this method. Interpretation of those prior results can be  made in the context of the updated reference intervals.     Potassium 4.3 3.4 - 5.3 mmol/L    Chloride 104 98 - 107 mmol/L    Carbon Dioxide (CO2) 28 22 - 29 mmol/L    Anion Gap 7 7 - 15 mmol/L    Urea Nitrogen 18.1 6.0 - 20.0 mg/dL    Creatinine 0.85 0.67 - 1.17 mg/dL    GFR Estimate >90 >60 mL/min/1.73m2    Calcium 9.2 8.6 - 10.0 mg/dL    Glucose 178 (H) 70 - 99 mg/dL   Lipid panel reflex to direct LDL Fasting   Result Value Ref Range    Cholesterol 129 <200 mg/dL    Triglycerides 121 <150 mg/dL    Direct Measure HDL 33 (L) >=40 mg/dL    LDL Cholesterol Calculated 72 <=100 mg/dL    Non HDL Cholesterol 96 <130 mg/dL    Patient Fasting > 8hrs? Unknown     Narrative    Cholesterol  Desirable:  <200 mg/dL    Triglycerides  Normal:  Less than 150 mg/dL  Borderline High:  150-199 mg/dL  High:  200-499 mg/dL  Very High:  Greater than or equal to 500 mg/dL    Direct Measure HDL  Female:  Greater than or equal to 50 mg/dL   Male:  Greater than or equal to 40 mg/dL    LDL Cholesterol  Desirable:  <100mg/dL  Above Desirable:  100-129 mg/dL   Borderline High:  130-159 mg/dL   High:  160-189 mg/dL   Very High:  >= 190 mg/dL    Non HDL Cholesterol  Desirable:  130 mg/dL  Above Desirable:  130-159 mg/dL  Borderline High:  160-189 mg/dL  High:  190-219 mg/dL  Very High:  Greater than or equal to 220 mg/dL   Hemoglobin A1c   Result Value Ref Range    Hemoglobin A1C 8.2 (H) 0.0 - 5.6 %      Comment:      Normal <5.7%   Prediabetes 5.7-6.4%    Diabetes 6.5% or higher     Note: Adopted from ADA consensus guidelines.   CBC with platelets and differential   Result Value Ref Range    WBC Count 4.1 4.0 - 11.0 10e3/uL    RBC Count 3.31 (L) 4.40 - 5.90 10e6/uL    Hemoglobin 10.1 (L) 13.3 - 17.7 g/dL    Hematocrit 29.8 (L) 40.0 - 53.0 %    MCV 90 78 - 100 fL    MCH 30.5 26.5 - 33.0 pg    MCHC 33.9 31.5 - 36.5 g/dL    RDW 14.2 10.0 - 15.0 %    Platelet Count 169 150 - 450 10e3/uL    % Neutrophils 55 %    % Lymphocytes 37 %    % Monocytes 6 %     % Eosinophils 2 %    % Basophils 1 %    % Immature Granulocytes 0 %    Absolute Neutrophils 2.3 1.6 - 8.3 10e3/uL    Absolute Lymphocytes 1.5 0.8 - 5.3 10e3/uL    Absolute Monocytes 0.3 0.0 - 1.3 10e3/uL    Absolute Eosinophils 0.1 0.0 - 0.7 10e3/uL    Absolute Basophils 0.0 0.0 - 0.2 10e3/uL    Absolute Immature Granulocytes 0.0 <=0.4 10e3/uL       If you have any questions or concerns, please call the clinic at the number listed above.       Sincerely,      Markos Dodge MD

## 2024-04-08 ENCOUNTER — ALLIED HEALTH/NURSE VISIT (OUTPATIENT)
Dept: EDUCATION SERVICES | Facility: CLINIC | Age: 49
End: 2024-04-08
Payer: COMMERCIAL

## 2024-04-08 VITALS — HEIGHT: 72 IN | WEIGHT: 287.8 LBS | BODY MASS INDEX: 38.98 KG/M2

## 2024-04-08 DIAGNOSIS — E11.9 CONTROLLED TYPE 2 DIABETES MELLITUS WITHOUT COMPLICATION, WITHOUT LONG-TERM CURRENT USE OF INSULIN (H): ICD-10-CM

## 2024-04-08 DIAGNOSIS — E11.9 TYPE 2 DIABETES MELLITUS WITHOUT COMPLICATION, WITHOUT LONG-TERM CURRENT USE OF INSULIN (H): Primary | ICD-10-CM

## 2024-04-08 PROCEDURE — G0108 DIAB MANAGE TRN  PER INDIV: HCPCS | Performed by: DIETITIAN, REGISTERED

## 2024-04-08 RX ORDER — SEMAGLUTIDE 1.34 MG/ML
1 INJECTION, SOLUTION SUBCUTANEOUS
Qty: 9 ML | Refills: 3 | Status: SHIPPED | OUTPATIENT
Start: 2024-05-15

## 2024-04-08 NOTE — PROGRESS NOTES
Diabetes Self-Management Education & Support    Presents for: Follow-up type 2 diabetes, obesity class 2 Body mass index is 39.03 kg/m .      Type of Service: In Person Visit      ASSESSMENT:  Patient has seen educator 7/2021.  Hemoglobin A1c now above target at 8.2%.  Patient does follow a fairly carbohydrate controlled meal plan with exceptions when eating out.  Patient is willing to incorporate additional dietary recommendations and will be starting on Ozempic.    Patient's most recent   Lab Results   Component Value Date    A1C 8.2 03/08/2024     is not meeting goal of <7.0    Diabetes knowledge and skills assessment:   Patient is knowledgeable in diabetes management concepts related to: Healthy Eating, Being Active, and Monitoring    Continue education with the following diabetes management concepts: Healthy Eating, Being Active, Monitoring, Taking Medication, Problem Solving, Reducing Risks, and Healthy Coping    Based on learning assessment above, most appropriate setting for further diabetes education would be: Individual setting.      PLAN    Continue with metformin as directed 750 mg twice daily  Ozempic   Weekly injection   Week 1-2  0.25mg  or you can take 0.25 weeks 1- 4 and then weeks 5 - 6 the 0.50mg dose   Week 3 - 5 0.50mg            Will need a new pen delivers only 1.0 mg - can stay on this dose a very long time and you don't have to increase     Potential to increase to 2.0 mg if desired for BG/ weight control          Goals:  Practice healthy stress management and mindful eating - think are you physically hungry or are you bored, stressed, emotional etc, make of list of things to do besides eat.    Try to get good quality sleep with a goal of 7-8 hours per night.  Stay physically active daily.  Recommend working up to a total of 30 minutes on 5 days/ week.  Recommend a fitness tracker.     Eat in a healthy way- eliminate trans fats, limit saturated fats and added sugars; follow the plate method  - picture above.  Keep a food record (Franky, Nilda).    A meal is 3 or more food groups; make it colorful for better nutrition.    Total Carbohydrates (in grams) = Breakfast  30    Lunch  30    Supper  30    If desired snacks 15                       See me 1x/ year    A1c goal keep it under 7%        Topics to cover at upcoming visits: In the area as needed for ongoing diabetes self-management support healthy Eating, Being Active, Monitoring, Taking Medication, Problem Solving, Reducing Risks, and Healthy Coping    Follow-up: Yearly/as needed    See Care Plan for co-developed, patient-state behavior change goals.  AVS provided for patient today.    Education Materials Provided:  Living Healthy with Diabetes, Goals for Your Diabetes Care, BG Log Sheet, Carbohydrate Counting, Medication Information on Ozempic, and My Plate Planner      SUBJECTIVE/OBJECTIVE:  Presents for: Follow-up  Accompanied by: Self  Diabetes education in the past 24mo: No  Focus of Visit: GLP-1 Start  GLP-1 Type: Ozempic  Diabetes type: Type 2  Disease course: Stable  How confident are you filling out medical forms by yourself:: Extremely  Transportation concerns: No  Difficulty affording diabetes medication?: No  Difficulty affording diabetes testing supplies?: No  Other concerns:: None  Cultural Influences/Ethnic Background:  Not  or     Diabetes Symptoms & Complications:  Diabetes Related Symptoms: None  Weight trend: Stable  Symptom course: Stable  Disease course: Stable  Complications assessed today?: Yes  Autonomic neuropathy: No  CVA: No  Heart disease: No  Nephropathy: No  Peripheral neuropathy: No  Peripheral Vascular Disease: No  Retinopathy: No  Sexual dysfunction: Yes    Patient Problem List and Family Medical History reviewed for relevant medical history, current medical status, and diabetes risk factors.    Vitals:  Ht 1.829 m (6')   Wt 130.5 kg (287 lb 12.8 oz)   BMI 39.03 kg/m    Estimated body mass index  "is 39.03 kg/m  as calculated from the following:    Height as of this encounter: 1.829 m (6').    Weight as of this encounter: 130.5 kg (287 lb 12.8 oz).   Last 3 BP:   BP Readings from Last 3 Encounters:   03/08/24 112/68   02/17/23 110/82   06/17/22 112/80       History   Smoking Status    Former    Types: Cigarettes   Smokeless Tobacco    Never       Labs:  Lab Results   Component Value Date    A1C 8.2 03/08/2024     Lab Results   Component Value Date     03/08/2024     06/17/2022     Lab Results   Component Value Date    LDL 72 03/08/2024     Direct Measure HDL   Date Value Ref Range Status   03/08/2024 33 (L) >=40 mg/dL Final   ]  GFR Estimate   Date Value Ref Range Status   03/08/2024 >90 >60 mL/min/1.73m2 Final     No results found for: \"GFRESTBLACK\"  Lab Results   Component Value Date    CR 0.85 03/08/2024     No results found for: \"MICROALBUMIN\"    Healthy Eating:  Healthy Eating Assessed Today: Yes  Cultural/Latter day diet restrictions?: No  Do you have any food allergies or intolerances?: No  Meal planning/habits: Avoiding sweets, Carb counting, Low carb  Who cooks/prepares meals for you?: Self, Spouse  Who purchases food in  your home?: Spouse  How many times a week on average do you eat food made away from home (restaurant/take-out)?: 1  Dinner: meat of some type, vegetable green beans, cauliflower, broccoli, not often a starch  Snacks: nuts, cheese, occ fruit  Other: occ unsweetened almond milk  Beverages: Water, Diet soda  Has patient met with a dietitian in the past?: Yes    Being Active:  Being Active Assessed Today: Yes  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 : No  Blood Glucose Meter: Accu-chek  Times checking blood sugar at home (number): 1 (150's - 160's)  Times checking blood sugar at home (per): Week  Blood glucose trend: Increasing    Taking " Medications:  Diabetes Medication(s)       Biguanides       metFORMIN (GLUCOPHAGE-XR) 750 MG 24 hr tablet Take 1 tablet (750 mg) by mouth 2 times daily (with meals)       Incretin Mimetic Agents       semaglutide (OZEMPIC) 2 MG/3ML pen Inject 0.25 mg Subcutaneous every 7 days for 28 days, THEN 0.5 mg every 7 days for 14 days.     Semaglutide, 1 MG/DOSE, (OZEMPIC, 1 MG/DOSE,) 4 MG/3ML pen Inject 1 mg Subcutaneous every 7 days            Taking Medication Assessed Today: Yes  Current Treatments: Oral Medication (taken by mouth)  Problems taking diabetes medications regularly?: No  Diabetes medication side effects?: No    Problem Solving:  Problem Solving Assessed Today: Yes  Is the patient at risk for hypoglycemia?: No  Patient carries a carbohydrate source: Not Needed  Is the patient at risk for DKA?: No  Does patient have severe weather/disaster plan for diabetes management?: Yes  Does patient have sick day plan for diabetes management?: Yes    Hypoglycemia Symptoms  Hypoglycemia: None    Hypoglycemia Complications  Hypoglycemia Complications: None    Reducing Risks:  Reducing Risks Assessed Today: Yes  Diabetes Risks: Age over 45 years, Family History  CAD Risks: Family history, Diabetes Mellitus, Male sex, Obesity  Has dilated eye exam at least once a year?: Yes  Sees dentist every 6 months?: No  Feet checked by healthcare provider in the last year?: Yes    Healthy Coping:  Healthy Coping Assessed Today: Yes  Emotional response to diabetes: Acceptance, Confidence diabetes can be controlled  Informal Support system:: Spouse  Stage of change: PREPARATION (Decided to change - considering how)  Support resources: Websites, Weight Management, Exercise  Patient Activation Measure Survey Score:       No data to display                  Care Plan and Education Provided:  GLP-1 administration technique taught today. Patient verbalized understanding and was able to perform an accurate return demonstration of administration  technique. Side effects were discussed, if patient has any abdominal pain, with or without nausea and/or vomiting, stop medication, call provider, clinic or go to the emergency room.  Care Plan: Diabetes   Updates made by Maxine Baerd RD since 4/8/2024 12:00 AM        Problem: HbA1C Not In Goal         Goal: Establish Regular Follow-Ups with PCP         Task: Discuss with PCP the recommended timing for patient's next follow up visit(s)    Responsible User: Maxine Beard RD        Task: Discuss schedule for PCP visits with patient Completed 4/8/2024   Responsible User: Maxine Beard RD        Goal: Get HbA1C Level in Goal         Task: Educate patient on diabetes education self-management topics    Responsible User: Maxine Beard RD        Task: Educate patient on benefits of regular glucose monitoring Completed 4/8/2024   Responsible User: Maxine Beard RD        Task: Refer patient to appropriate extended care team member, as needed (Medication Therapy Management, Behavioral Health, Physical Therapy, etc.)    Responsible User: Maxine Beard RD        Task: Discuss diabetes treatment plan with patient Completed 4/8/2024   Responsible User: Maxine Beard RD        Problem: Diabetes Self-Management Education Needed to Optimize Self-Care Behaviors         Goal: Understand diabetes pathophysiology and disease progression         Task: Provide education on diabetes pathophysiology and disease progression specfic to patient's diabetes type Completed 4/8/2024   Responsible User: Maxine Beard RD        Goal: Healthy Eating - follow a healthy eating pattern for diabetes         Task: Provide education on portion control and consistency in amount, composition and timing of food intake Completed 4/8/2024   Responsible User: Maxine Beard RD        Task: Provide education on managing carbohydrate intake (carbohydrate counting, plate planning method, etc.)    Responsible User: Maxine Beard RD        Task:  Provide education on weight management Completed 4/8/2024   Responsible User: Maxine Beard RD        Task: Provide education on heart healthy eating Completed 4/8/2024   Responsible User: Maxine Beard RD        Task: Provide education on eating out    Responsible User: Maxine Beard RD        Task: Develop individualized healthy eating plan with patient    Responsible User: Maxine Beard RD        Goal: Being Active - get regular physical activity, working up to at least 150 minutes per week         Task: Provide education on relationship of activity to glucose and precautions to take if at risk for low glucose Completed 4/8/2024   Responsible User: Maxine Beard RD        Task: Discuss barriers to physical activity with patient    Responsible User: Maxine Beard RD        Task: Develop physical activity plan with patient    Responsible User: Maxine Beard RD        Task: Explore community resources including walking groups, assistance programs, and home videos    Responsible User: Maxine Beard RD        Goal: Monitoring - monitor glucose and ketones as directed         Task: Provide education on blood glucose monitoring (purpose, proper technique, frequency, glucose targets, interpreting results, when to use glucose control solution, sharps disposal) Completed 4/8/2024   Responsible User: Maxine Beard RD        Task: Provide education on continuous glucose monitoring (sensor placement, use of melly or /reader, understanding glucose trends, alerts and alarms, differences between sensor glucose and blood glucose)    Responsible User: Maxine Beard RD        Task: Provide education on ketone monitoring (when to monitor, frequency, etc.)    Responsible User: Maxine Beard RD        Goal: Taking Medication - patient is consistently taking medications as directed    This Visit's Progress: 100%   Note:    Patient to take diabetes medications as prescribed  - patient to begin taking Ozempic  weekly as directed       Task: Provide education on action of prescribed medication, including when to take and possible side effects Completed 4/8/2024   Responsible User: Maxine Beard RD        Task: Provide education on insulin and injectable diabetes medications, including administration, storage, site selection and rotation for injection sites Completed 4/8/2024   Responsible User: Maxine Beard RD        Task: Discuss barriers to medication adherence with patient and provide management technique ideas as appropriate    Responsible User: Maxine Beard RD        Task: Provide education on frequency and refill details of medications    Responsible User: Maxine Beard RD        Goal: Problem Solving - know how to prevent and manage short-term diabetes complications         Task: Provide education on high blood glucose - causes, signs/symptoms, prevention and treatment    Responsible User: Maxine Beard RD        Task: Provide education on low blood glucose - causes, signs/symptoms, prevention, treatment, carrying a carbohydrate source at all times, and medical identification    Responsible User: Maxine Beard RD        Task: Provide education on safe travel with diabetes    Responsible User: Maxine Beard RD        Task: Provide education on how to care for diabetes on sick days Completed 4/8/2024   Responsible User: Maxine Beard RD        Task: Provide education on when to call a health care provider    Responsible User: Maxine Beard RD        Goal: Reducing Risks - know how to prevent and treat long-term diabetes complications         Task: Provide education on major complications of diabetes, prevention, early diagnostic measures and treatment of complications    Responsible User: Maxine Beard RD        Task: Provide education on recommended care for dental, eye and foot health    Responsible User: Maxine Beard RD        Task: Provide education on Hemoglobin A1c - goals and  relationship to blood glucose levels Completed 4/8/2024   Responsible User: Maxine Beard RD        Task: Provide education on recommendations for heart health - lipid levels and goals, blood pressure and goals, and aspirin therapy, if indicated    Responsible User: Maxine Beard RD        Task: Provide education on tobacco cessation    Responsible User: Maxine Beard RD        Goal: Healthy Coping - use available resources to cope with the challenges of managing diabetes         Task: Discuss recognizing feelings about having diabetes    Responsible User: Maxine Beard RD        Task: Provide education on the benefits of making appropriate lifestyle changes Completed 4/8/2024   Responsible User: Maxine Beard RD        Task: Provide education on benefits of utilizing support systems    Responsible User: Maxine Beard RD        Task: Discuss methods for coping with stress    Responsible User: Maxine Beard RD        Task: Provide education on when to seek professional counseling    Responsible User: Maxine Beard RD          Time Spent: 60 minutes  Encounter Type: Individual    Any diabetes medication dose changes were made via the CDE Protocol per the patient's referring provider. A copy of this encounter was shared with the provider.

## 2024-04-08 NOTE — LETTER
4/8/2024         RE: Salvador Girard  [address] [facility] [address]        Dear Colleague,    Thank you for referring your patient, Salvador Girard, to the [facility]. Please see a copy of my visit note below.    Diabetes Self-Management Education & Support    Presents for: Follow-up type 2 diabetes, obesity class 2 Body mass index is 39.03 kg/m .      Type of Service: In Person Visit      ASSESSMENT:  Patient has seen educator 7/2021.  Hemoglobin A1c now above target at 8.2%.  Patient does follow a fairly carbohydrate controlled meal plan with exceptions when eating out.  Patient is willing to incorporate additional dietary recommendations and will be starting on Ozempic.    Patient's most recent   Lab Results   Component Value Date    A1C 8.2 03/08/2024     is not meeting goal of <7.0    Diabetes knowledge and skills assessment:   Patient is knowledgeable in diabetes management concepts related to: Healthy Eating, Being Active, and Monitoring    Continue education with the following diabetes management concepts: Healthy Eating, Being Active, Monitoring, Taking Medication, Problem Solving, Reducing Risks, and Healthy Coping    Based on learning assessment above, most appropriate setting for further diabetes education would be: Individual setting.      PLAN    Continue with metformin as directed 750 mg twice daily  Ozempic   Weekly injection   Week 1-2  0.25mg  or you can take 0.25 weeks 1- 4 and then weeks 5 - 6 the 0.50mg dose   Week 3 - 5 0.50mg            Will need a new pen delivers only 1.0 mg - can stay on this dose a very long time and you don't have to increase     Potential to increase to 2.0 mg if desired for BG/ weight control          Goals:  Practice healthy stress management and mindful eating - think are you physically hungry or are you bored, stressed, emotional etc, make of list of things to do besides eat.    Try to get good quality sleep with a goal of 7-8  hours per night.  Stay physically active daily.  Recommend working up to a total of 30 minutes on 5 days/ week.  Recommend a fitness tracker.     Eat in a healthy way- eliminate trans fats, limit saturated fats and added sugars; follow the plate method - picture above.  Keep a food record (Franky, Nilda).    A meal is 3 or more food groups; make it colorful for better nutrition.    Total Carbohydrates (in grams) = Breakfast  30    Lunch  30    Supper  30    If desired snacks 15                       See me 1x/ year    A1c goal keep it under 7%        Topics to cover at upcoming visits: In the area as needed for ongoing diabetes self-management support healthy Eating, Being Active, Monitoring, Taking Medication, Problem Solving, Reducing Risks, and Healthy Coping    Follow-up: Yearly/as needed    See Care Plan for co-developed, patient-state behavior change goals.  AVS provided for patient today.    Education Materials Provided:  Living Healthy with Diabetes, Goals for Your Diabetes Care, BG Log Sheet, Carbohydrate Counting, Medication Information on Ozempic, and My Plate Planner      SUBJECTIVE/OBJECTIVE:  Presents for: Follow-up  Accompanied by: Self  Diabetes education in the past 24mo: No  Focus of Visit: GLP-1 Start  GLP-1 Type: Ozempic  Diabetes type: Type 2  Disease course: Stable  How confident are you filling out medical forms by yourself:: Extremely  Transportation concerns: No  Difficulty affording diabetes medication?: No  Difficulty affording diabetes testing supplies?: No  Other concerns:: None  Cultural Influences/Ethnic Background:  Not  or     Diabetes Symptoms & Complications:  Diabetes Related Symptoms: None  Weight trend: Stable  Symptom course: Stable  Disease course: Stable  Complications assessed today?: Yes  Autonomic neuropathy: No  CVA: No  Heart disease: No  Nephropathy: No  Peripheral neuropathy: No  Peripheral Vascular Disease: No  Retinopathy: No  Sexual dysfunction:  "Yes    Patient Problem List and Family Medical History reviewed for relevant medical history, current medical status, and diabetes risk factors.    Vitals:  Ht 1.829 m (6')   Wt 130.5 kg (287 lb 12.8 oz)   BMI 39.03 kg/m    Estimated body mass index is 39.03 kg/m  as calculated from the following:    Height as of this encounter: 1.829 m (6').    Weight as of this encounter: 130.5 kg (287 lb 12.8 oz).   Last 3 BP:   BP Readings from Last 3 Encounters:   03/08/24 112/68   02/17/23 110/82   06/17/22 112/80       History   Smoking Status     Former     Types: Cigarettes   Smokeless Tobacco     Never       Labs:  Lab Results   Component Value Date    A1C 8.2 03/08/2024     Lab Results   Component Value Date     03/08/2024     06/17/2022     Lab Results   Component Value Date    LDL 72 03/08/2024     Direct Measure HDL   Date Value Ref Range Status   03/08/2024 33 (L) >=40 mg/dL Final   ]  GFR Estimate   Date Value Ref Range Status   03/08/2024 >90 >60 mL/min/1.73m2 Final     No results found for: \"GFRESTBLACK\"  Lab Results   Component Value Date    CR 0.85 03/08/2024     No results found for: \"MICROALBUMIN\"    Healthy Eating:  Healthy Eating Assessed Today: Yes  Cultural/Judaism diet restrictions?: No  Do you have any food allergies or intolerances?: No  Meal planning/habits: Avoiding sweets, Carb counting, Low carb  Who cooks/prepares meals for you?: Self, Spouse  Who purchases food in  your home?: Spouse  How many times a week on average do you eat food made away from home (restaurant/take-out)?: 1  Dinner: meat of some type, vegetable green beans, cauliflower, broccoli, not often a starch  Snacks: nuts, cheese, occ fruit  Other: occ unsweetened almond milk  Beverages: Water, Diet soda  Has patient met with a dietitian in the past?: Yes    Being Active:  Being Active Assessed Today: Yes  Exercise::  (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 : No  Blood Glucose Meter: Accu-chek  Times checking blood sugar at home (number): 1 (150's - 160's)  Times checking blood sugar at home (per): Week  Blood glucose trend: Increasing    Taking Medications:  Diabetes Medication(s)       Biguanides       metFORMIN (GLUCOPHAGE-XR) 750 MG 24 hr tablet Take 1 tablet (750 mg) by mouth 2 times daily (with meals)       Incretin Mimetic Agents       semaglutide (OZEMPIC) 2 MG/3ML pen Inject 0.25 mg Subcutaneous every 7 days for 28 days, THEN 0.5 mg every 7 days for 14 days.     Semaglutide, 1 MG/DOSE, (OZEMPIC, 1 MG/DOSE,) 4 MG/3ML pen Inject 1 mg Subcutaneous every 7 days            Taking Medication Assessed Today: Yes  Current Treatments: Oral Medication (taken by mouth)  Problems taking diabetes medications regularly?: No  Diabetes medication side effects?: No    Problem Solving:  Problem Solving Assessed Today: Yes  Is the patient at risk for hypoglycemia?: No  Patient carries a carbohydrate source: Not Needed  Is the patient at risk for DKA?: No  Does patient have severe weather/disaster plan for diabetes management?: Yes  Does patient have sick day plan for diabetes management?: Yes    Hypoglycemia Symptoms  Hypoglycemia: None    Hypoglycemia Complications  Hypoglycemia Complications: None    Reducing Risks:  Reducing Risks Assessed Today: Yes  Diabetes Risks: Age over 45 years, Family History  CAD Risks: Family history, Diabetes Mellitus, Male sex, Obesity  Has dilated eye exam at least once a year?: Yes  Sees dentist every 6 months?: No  Feet checked by healthcare provider in the last year?: Yes    Healthy Coping:  Healthy Coping Assessed Today: Yes  Emotional response to diabetes: Acceptance, Confidence diabetes can be controlled  Informal Support system:: Spouse  Stage of change: PREPARATION (Decided to change - considering how)  Support resources: Websites, Weight Management, Exercise  Patient Activation Measure  Survey Score:       No data to display                  Care Plan and Education Provided:  GLP-1 administration technique taught today. Patient verbalized understanding and was able to perform an accurate return demonstration of administration technique. Side effects were discussed, if patient has any abdominal pain, with or without nausea and/or vomiting, stop medication, call provider, clinic or go to the emergency room.  Care Plan: Diabetes   Updates made by Maxine Beard RD since 4/8/2024 12:00 AM        Problem: HbA1C Not In Goal         Goal: Establish Regular Follow-Ups with PCP         Task: Discuss with PCP the recommended timing for patient's next follow up visit(s)    Responsible User: Maxine Beard RD        Task: Discuss schedule for PCP visits with patient Completed 4/8/2024   Responsible User: Maxine Beard RD        Goal: Get HbA1C Level in Goal         Task: Educate patient on diabetes education self-management topics    Responsible User: Maxine Beard RD        Task: Educate patient on benefits of regular glucose monitoring Completed 4/8/2024   Responsible User: Maxine Beard RD        Task: Refer patient to appropriate extended care team member, as needed (Medication Therapy Management, Behavioral Health, Physical Therapy, etc.)    Responsible User: Maxine Beard RD        Task: Discuss diabetes treatment plan with patient Completed 4/8/2024   Responsible User: Maxine Beard RD        Problem: Diabetes Self-Management Education Needed to Optimize Self-Care Behaviors         Goal: Understand diabetes pathophysiology and disease progression         Task: Provide education on diabetes pathophysiology and disease progression specfic to patient's diabetes type Completed 4/8/2024   Responsible User: Maxine Beard RD        Goal: Healthy Eating - follow a healthy eating pattern for diabetes         Task: Provide education on portion control and consistency in amount, composition and  timing of food intake Completed 4/8/2024   Responsible User: Maxine Beard RD        Task: Provide education on managing carbohydrate intake (carbohydrate counting, plate planning method, etc.)    Responsible User: Maxine Beard RD        Task: Provide education on weight management Completed 4/8/2024   Responsible User: Maxine Beard RD        Task: Provide education on heart healthy eating Completed 4/8/2024   Responsible User: Maxien Beard RD        Task: Provide education on eating out    Responsible User: Maxine Beard RD        Task: Develop individualized healthy eating plan with patient    Responsible User: Maxine Beard RD        Goal: Being Active - get regular physical activity, working up to at least 150 minutes per week         Task: Provide education on relationship of activity to glucose and precautions to take if at risk for low glucose Completed 4/8/2024   Responsible User: Maxine Beard RD        Task: Discuss barriers to physical activity with patient    Responsible User: Maxine Beard RD        Task: Develop physical activity plan with patient    Responsible User: Maxine Beard RD        Task: Explore community resources including walking groups, assistance programs, and home videos    Responsible User: Maxine Beard RD        Goal: Monitoring - monitor glucose and ketones as directed         Task: Provide education on blood glucose monitoring (purpose, proper technique, frequency, glucose targets, interpreting results, when to use glucose control solution, sharps disposal) Completed 4/8/2024   Responsible User: Maxine Beard RD        Task: Provide education on continuous glucose monitoring (sensor placement, use of melly or /reader, understanding glucose trends, alerts and alarms, differences between sensor glucose and blood glucose)    Responsible User: Maxine Beard RD        Task: Provide education on ketone monitoring (when to monitor, frequency, etc.)     Responsible User: Maxine Beard RD        Goal: Taking Medication - patient is consistently taking medications as directed    This Visit's Progress: 100%   Note:    Patient to take diabetes medications as prescribed  - patient to begin taking Ozempic weekly as directed       Task: Provide education on action of prescribed medication, including when to take and possible side effects Completed 4/8/2024   Responsible User: Maxine Beard RD        Task: Provide education on insulin and injectable diabetes medications, including administration, storage, site selection and rotation for injection sites Completed 4/8/2024   Responsible User: Maxine Beard RD        Task: Discuss barriers to medication adherence with patient and provide management technique ideas as appropriate    Responsible User: Maxine Beard RD        Task: Provide education on frequency and refill details of medications    Responsible User: Maxine Beard RD        Goal: Problem Solving - know how to prevent and manage short-term diabetes complications         Task: Provide education on high blood glucose - causes, signs/symptoms, prevention and treatment    Responsible User: Maxine Beard RD        Task: Provide education on low blood glucose - causes, signs/symptoms, prevention, treatment, carrying a carbohydrate source at all times, and medical identification    Responsible User: Maxine Beard RD        Task: Provide education on safe travel with diabetes    Responsible User: Maxine Beard RD        Task: Provide education on how to care for diabetes on sick days Completed 4/8/2024   Responsible User: Maxine Beard RD        Task: Provide education on when to call a health care provider    Responsible User: Maxine Beard RD        Goal: Reducing Risks - know how to prevent and treat long-term diabetes complications         Task: Provide education on major complications of diabetes, prevention, early diagnostic measures and  treatment of complications    Responsible User: Maxine Beard RD        Task: Provide education on recommended care for dental, eye and foot health    Responsible User: Maxine Beard RD        Task: Provide education on Hemoglobin A1c - goals and relationship to blood glucose levels Completed 4/8/2024   Responsible User: Maxine Beard RD        Task: Provide education on recommendations for heart health - lipid levels and goals, blood pressure and goals, and aspirin therapy, if indicated    Responsible User: Maxine Beard RD        Task: Provide education on tobacco cessation    Responsible User: Maxine Beard RD        Goal: Healthy Coping - use available resources to cope with the challenges of managing diabetes         Task: Discuss recognizing feelings about having diabetes    Responsible User: Maxine Beard RD        Task: Provide education on the benefits of making appropriate lifestyle changes Completed 4/8/2024   Responsible User: Maxine Beard RD        Task: Provide education on benefits of utilizing support systems    Responsible User: Maxine Beard RD        Task: Discuss methods for coping with stress    Responsible User: Maxine Beard RD        Task: Provide education on when to seek professional counseling    Responsible User: Maxine Beard RD          Time Spent: 60 minutes  Encounter Type: Individual    Any diabetes medication dose changes were made via the CDE Protocol per the patient's referring provider. A copy of this encounter was shared with the provider.

## 2024-04-09 ENCOUNTER — TELEPHONE (OUTPATIENT)
Dept: FAMILY MEDICINE | Facility: CLINIC | Age: 49
End: 2024-04-09

## 2024-04-11 ENCOUNTER — TELEPHONE (OUTPATIENT)
Dept: FAMILY MEDICINE | Facility: CLINIC | Age: 49
End: 2024-04-11
Payer: COMMERCIAL

## 2024-04-11 NOTE — TELEPHONE ENCOUNTER
Prior Authorization Retail Medication Request    Medication/Dose: Ozempic 4MG/3ML  Diagnosis and ICD code (if different than what is on RX):  E11.9  New/renewal/insurance change PA/secondary ins. PA:  Previously Tried and Failed:  NA  Rationale:  NA    Insurance   Primary: [employer] [place] of [place]  Insurance ID:  [id number]    Secondary (if applicable):NA  Insurance ID:  NA    Pharmacy Information (if different than what is on RX)  Name:  [facility]  Phone:  [phone]  Fax:[phone]

## 2024-04-18 NOTE — TELEPHONE ENCOUNTER
Fax received from [employer] stating that PA is not required for this medication. Patient may need to obtain this through his mail order benefits.     Left voicemail for patient to return call to clinic. When patient returns call, please give them above message and ask patient if he is okay with us sending this to [employer].

## 2024-04-22 NOTE — TELEPHONE ENCOUNTER
Central Prior Authorization Team - Phone: [phone]     PA Initiation    Medication: OZEMPIC (1 MG/DOSE) 4 MG/3ML SC SOPN  Insurance Company: [employer] - Phone [phone] Fax [phone]  Pharmacy Filling the Rx: [facility] [place]  [place] - [address] [place] [address]  Filling Pharmacy Phone: [phone]  Filling Pharmacy Fax:    Start Date: 4/22/2024

## 2024-04-24 NOTE — TELEPHONE ENCOUNTER
Central Prior Authorization Team - Phone: [phone]     Prior Authorization Not Needed per Insurance    Medication: OZEMPIC (1 MG/DOSE) 4 MG/3ML SC SOPN  Insurance Company: [employer] - Phone [phone] Fax [phone]  Expected CoPay: $    Pharmacy Filling the Rx: [facility] [place], [place] - [address] [place] [address]  Pharmacy Notified: YES   Patient Notified: YES pharmacy will notify when ready

## 2024-07-11 DIAGNOSIS — I10 PRIMARY HYPERTENSION: ICD-10-CM

## 2024-07-11 RX ORDER — LOSARTAN POTASSIUM 25 MG/1
25 TABLET ORAL DAILY
Qty: 90 TABLET | Refills: 2 | Status: SHIPPED | OUTPATIENT
Start: 2024-07-11

## 2025-04-28 ENCOUNTER — OFFICE VISIT (OUTPATIENT)
Dept: FAMILY MEDICINE | Facility: CLINIC | Age: 50
End: 2025-04-28
Payer: COMMERCIAL

## 2025-04-28 VITALS
SYSTOLIC BLOOD PRESSURE: 128 MMHG | HEIGHT: 72 IN | RESPIRATION RATE: 16 BRPM | WEIGHT: 285.1 LBS | DIASTOLIC BLOOD PRESSURE: 80 MMHG | OXYGEN SATURATION: 96 % | TEMPERATURE: 98.2 F | HEART RATE: 75 BPM | BODY MASS INDEX: 38.62 KG/M2

## 2025-04-28 DIAGNOSIS — N52.9 ERECTILE DYSFUNCTION, UNSPECIFIED ERECTILE DYSFUNCTION TYPE: ICD-10-CM

## 2025-04-28 DIAGNOSIS — Z12.11 SCREEN FOR COLON CANCER: Primary | ICD-10-CM

## 2025-04-28 DIAGNOSIS — I10 PRIMARY HYPERTENSION: ICD-10-CM

## 2025-04-28 DIAGNOSIS — E11.9 CONTROLLED TYPE 2 DIABETES MELLITUS WITHOUT COMPLICATION, WITHOUT LONG-TERM CURRENT USE OF INSULIN (H): ICD-10-CM

## 2025-04-28 DIAGNOSIS — E66.01 MORBID OBESITY (H): ICD-10-CM

## 2025-04-28 LAB
EST. AVERAGE GLUCOSE BLD GHB EST-MCNC: 200 MG/DL
HBA1C MFR BLD: 8.6 % (ref 0–5.6)

## 2025-04-28 PROCEDURE — 82043 UR ALBUMIN QUANTITATIVE: CPT | Performed by: NURSE PRACTITIONER

## 2025-04-28 PROCEDURE — 80061 LIPID PANEL: CPT | Performed by: NURSE PRACTITIONER

## 2025-04-28 PROCEDURE — 36415 COLL VENOUS BLD VENIPUNCTURE: CPT | Performed by: NURSE PRACTITIONER

## 2025-04-28 PROCEDURE — 3074F SYST BP LT 130 MM HG: CPT | Performed by: NURSE PRACTITIONER

## 2025-04-28 PROCEDURE — 83036 HEMOGLOBIN GLYCOSYLATED A1C: CPT | Performed by: NURSE PRACTITIONER

## 2025-04-28 PROCEDURE — 80048 BASIC METABOLIC PNL TOTAL CA: CPT | Performed by: NURSE PRACTITIONER

## 2025-04-28 PROCEDURE — G2211 COMPLEX E/M VISIT ADD ON: HCPCS | Performed by: NURSE PRACTITIONER

## 2025-04-28 PROCEDURE — 3079F DIAST BP 80-89 MM HG: CPT | Performed by: NURSE PRACTITIONER

## 2025-04-28 PROCEDURE — 99214 OFFICE O/P EST MOD 30 MIN: CPT | Performed by: NURSE PRACTITIONER

## 2025-04-28 RX ORDER — METFORMIN HYDROCHLORIDE 750 MG/1
750 TABLET, EXTENDED RELEASE ORAL 2 TIMES DAILY WITH MEALS
Qty: 180 TABLET | Refills: 1 | Status: SHIPPED | OUTPATIENT
Start: 2025-04-28

## 2025-04-28 RX ORDER — LOSARTAN POTASSIUM 25 MG/1
25 TABLET ORAL DAILY
Qty: 90 TABLET | Refills: 3 | Status: SHIPPED | OUTPATIENT
Start: 2025-04-28

## 2025-04-28 RX ORDER — SILDENAFIL CITRATE 20 MG/1
20-100 TABLET ORAL DAILY PRN
Qty: 30 TABLET | Refills: 4 | Status: SHIPPED | OUTPATIENT
Start: 2025-04-28

## 2025-04-28 NOTE — PROGRESS NOTES
Assessment & Plan     (Z12.11) Screen for colon cancer  (primary encounter diagnosis)  Comment:   Plan: COLOGUARD([employer]ACT SCIENCES)        No FH, would like to avoid colonoscopy    (E11.9) Controlled type 2 diabetes mellitus without complication, without long-term current use of insulin (H)  Comment:   Plan: Adult Eye  Referral, REVIEW OF HEALTH         MAINTENANCE PROTOCOL ORDERS, HEMOGLOBIN A1C,         BASIC METABOLIC PANEL, Lipid panel reflex to         direct LDL Non-fasting, Albumin Random Urine         Quantitative with Creat Ratio, metFORMIN         (GLUCOPHAGE-XR) 750 MG 24 hr tablet        Struggles with wt, admits to little exercise. Is not on max dose of ozempic, started about 18 months ago and then no follow up. Agreeable to increasing dose, advised that goal for A1c in 49 year old male could be under 7. Hopefully we will see some wt loss with the increased dose also. BMI hinders control of DM and HTN    (I10) Primary hypertension  Comment:   Plan: BASIC METABOLIC PANEL, losartan (COZAAR) 25 MG         tablet        controlled    (E66.01) Morbid obesity (H)  Comment:   Plan: Semaglutide, 2 MG/DOSE, (OZEMPIC) 8 MG/3ML pen        As above    (N52.9) Erectile dysfunction, unspecified erectile dysfunction type  Comment:   Plan: sildenafil (REVATIO) 20 MG tablet        stable          BMI  Estimated body mass index is 38.67 kg/m  as calculated from the following:    Height as of this encounter: 1.829 m (6').    Weight as of this encounter: 129.3 kg (285 lb 1.6 oz).             Alexandr Franco is a 49 year old, presenting for the following health issues: patient is here for med check/refills, past due for diabetic labs - is NOT fasting    Random blood sugars at home 150  Was following with DR Dodge who has retired  Dx with DM about 3 years ago, saw RD about 18 months ago, started Ozempic and is affordable and tolerates well but not at max dose  No FH colono cancer  Declines vaccines  Denies  neuropathy  Overdue for diabetic eye exam    FH--parents living; dad is in nursing home now with dementia  Chronic Disease Management, Diabetes, and Recheck Medication        4/28/2025     5:17 PM   Additional Questions   Roomed by shelby ireland   Accompanied by self     History of Present Illness       Diabetes:   He presents for follow up of diabetes.  He is checking home blood glucose a few times a month.   He checks blood glucose before and after meals.  Blood glucose is never over 200 and never under 70.  When his blood glucose is low, the patient is asymptomatic for confusion, blurred vision, lethargy and reports not feeling dizzy, shaky, or weak.   He has no concerns regarding his diabetes at this time.   He is not experiencing numbness or burning in feet, excessive thirst, blurry vision, weight changes or redness, sores or blisters on feet. The patient has not had a diabetic eye exam in the last 12 months.          He eats 0-1 servings of fruits and vegetables daily.He consumes 0 sweetened beverage(s) daily.He exercises with enough effort to increase his heart rate 20 to 29 minutes per day.  He exercises with enough effort to increase his heart rate 3 or less days per week.   He is taking medications regularly.                  Objective    /80 (BP Location: Right arm, Patient Position: Sitting)   Pulse 75   Temp 98.2  F (36.8  C)   Resp 16   Ht 1.829 m (6')   Wt 129.3 kg (285 lb 1.6 oz)   SpO2 96%   BMI 38.67 kg/m    Body mass index is 38.67 kg/m .  Physical Exam   GENERAL: alert and no distress  EYES: Eyes grossly normal to inspection, PERRL and conjunctivae and sclerae normal  NECK: no adenopathy, no asymmetry, masses, or scars  RESP: lungs clear to auscultation - no rales, rhonchi or wheezes  CV: regular rate and rhythm, normal S1 S2, no S3 or S4, no murmur, click or rub, no peripheral edema  MS: no gross musculoskeletal defects noted, no edema  Diabetic foot exam: normal DP and PT pulses, no  trophic changes or ulcerative lesions, normal sensory exam, and normal monofilament exam            Signed Electronically by: Fabiola Healy NP

## 2025-04-28 NOTE — LETTER
April 30, 2025      Salvador Girard  [address] [place] [id number]        Dear ,    We are writing to inform you of your test results.    Glad you are set up to see Anastasia Beard RD and increasing your dose of diabetes medication as the diabetes looks to be affecting kidneys also.    Resulted Orders   HEMOGLOBIN A1C   Result Value Ref Range    Estimated Average Glucose 200 (H) <117 mg/dL    Hemoglobin A1C 8.6 (H) 0.0 - 5.6 %      Comment:      Normal <5.7%   Prediabetes 5.7-6.4%    Diabetes 6.5% or higher     Note: Adopted from ADA consensus guidelines.   BASIC METABOLIC PANEL   Result Value Ref Range    Sodium 137 135 - 145 mmol/L    Potassium 5.0 3.4 - 5.3 mmol/L    Chloride 102 98 - 107 mmol/L    Carbon Dioxide (CO2) 27 22 - 29 mmol/L    Anion Gap 8 7 - 15 mmol/L    Urea Nitrogen 17.4 6.0 - 20.0 mg/dL    Creatinine 0.89 0.67 - 1.17 mg/dL    GFR Estimate >90 >60 mL/min/1.73m2      Comment:      eGFR calculated using 2021 CKD-EPI equation.    Calcium 9.9 8.8 - 10.4 mg/dL    Glucose 146 (H) 70 - 99 mg/dL    Patient Fasting > 8hrs? No    Lipid panel reflex to direct LDL Non-fasting   Result Value Ref Range    Cholesterol 140 <200 mg/dL    Triglycerides 180 (H) <150 mg/dL    Direct Measure HDL 37 (L) >=40 mg/dL    LDL Cholesterol Calculated 67 <100 mg/dL    Non HDL Cholesterol 103 <130 mg/dL    Patient Fasting > 8hrs? No     Narrative    Cholesterol  Desirable: < 200 mg/dL  Borderline High: 200 - 239 mg/dL  High: >= 240 mg/dL    Triglycerides  Normal: < 150 mg/dL  Borderline High: 150 - 199 mg/dL  High: 200-499 mg/dL  Very High: >= 500 mg/dL    Direct Measure HDL  Female: >= 50 mg/dL   Male: >= 40 mg/dL    LDL Cholesterol  Desirable: < 100 mg/dL  Above Desirable: 100 - 129 mg/dL   Borderline High: 130 - 159 mg/dL   High:  160 - 189 mg/dL   Very High: >= 190 mg/dL    Non HDL Cholesterol  Desirable: < 130 mg/dL  Above Desirable: 130 - 159 mg/dL  Borderline High: 160 - 189 mg/dL  High: 190 - 219 mg/dL  Very  High: >= 220 mg/dL   Albumin Random Urine Quantitative with Creat Ratio   Result Value Ref Range    Creatinine Urine mg/dL 269.0 mg/dL      Comment:      The reference ranges have not been established in urine creatinine. The results should be integrated into the clinical context for interpretation.    Albumin Urine mg/L 72.3 mg/L      Comment:      The reference ranges have not been established in urine albumin. The results should be integrated into the clinical context for interpretation.    Albumin Urine mg/g Cr 26.88 (H) 0.00 - 17.00 mg/g Cr      Comment:      Microalbuminuria is defined as an albumin:creatinine ratio of 17 to 299 for males and 25 to 299 for females. A ratio of albumin:creatinine of 300 or higher is indicative of overt proteinuria.  Due to biologic variability, positive results should be confirmed by a second, first-morning random or 24-hour timed urine specimen. If there is discrepancy, a third specimen is recommended. When 2 out of 3 results are in the microalbuminuria range, this is evidence for incipient nephropathy and warrants increased efforts at glucose control, blood pressure control, and institution of therapy with an angiotensin-converting-enzyme (ACE) inhibitor (if the patient can tolerate it).         If you have any questions or concerns, please call the clinic at the number listed above.       Sincerely,      Fabiola Healy NP    Electronically signed

## 2025-04-29 LAB
ANION GAP SERPL CALCULATED.3IONS-SCNC: 8 MMOL/L (ref 7–15)
BUN SERPL-MCNC: 17.4 MG/DL (ref 6–20)
CALCIUM SERPL-MCNC: 9.9 MG/DL (ref 8.8–10.4)
CHLORIDE SERPL-SCNC: 102 MMOL/L (ref 98–107)
CHOLEST SERPL-MCNC: 140 MG/DL
CREAT SERPL-MCNC: 0.89 MG/DL (ref 0.67–1.17)
CREAT UR-MCNC: 269 MG/DL
EGFRCR SERPLBLD CKD-EPI 2021: >90 ML/MIN/1.73M2
FASTING STATUS PATIENT QL REPORTED: NO
FASTING STATUS PATIENT QL REPORTED: NO
GLUCOSE SERPL-MCNC: 146 MG/DL (ref 70–99)
HCO3 SERPL-SCNC: 27 MMOL/L (ref 22–29)
HDLC SERPL-MCNC: 37 MG/DL
LDLC SERPL CALC-MCNC: 67 MG/DL
MICROALBUMIN UR-MCNC: 72.3 MG/L
MICROALBUMIN/CREAT UR: 26.88 MG/G CR (ref 0–17)
NONHDLC SERPL-MCNC: 103 MG/DL
POTASSIUM SERPL-SCNC: 5 MMOL/L (ref 3.4–5.3)
SODIUM SERPL-SCNC: 137 MMOL/L (ref 135–145)
TRIGL SERPL-MCNC: 180 MG/DL